# Patient Record
Sex: MALE | Race: WHITE | Employment: OTHER | URBAN - METROPOLITAN AREA
[De-identification: names, ages, dates, MRNs, and addresses within clinical notes are randomized per-mention and may not be internally consistent; named-entity substitution may affect disease eponyms.]

---

## 2018-07-02 ENCOUNTER — APPOINTMENT (OUTPATIENT)
Dept: GENERAL RADIOLOGY | Facility: CLINIC | Age: 63
DRG: 871 | End: 2018-07-02
Attending: EMERGENCY MEDICINE
Payer: MEDICARE

## 2018-07-02 ENCOUNTER — APPOINTMENT (OUTPATIENT)
Dept: CT IMAGING | Facility: CLINIC | Age: 63
DRG: 871 | End: 2018-07-02
Attending: EMERGENCY MEDICINE
Payer: MEDICARE

## 2018-07-02 ENCOUNTER — HOSPITAL ENCOUNTER (INPATIENT)
Facility: CLINIC | Age: 63
LOS: 2 days | Discharge: HOME | DRG: 871 | End: 2018-07-05
Attending: EMERGENCY MEDICINE | Admitting: INTERNAL MEDICINE
Payer: MEDICARE

## 2018-07-02 DIAGNOSIS — R11.0 NAUSEA: ICD-10-CM

## 2018-07-02 DIAGNOSIS — R17 ELEVATED BILIRUBIN: ICD-10-CM

## 2018-07-02 DIAGNOSIS — D61.818 OTHER PANCYTOPENIA (H): ICD-10-CM

## 2018-07-02 DIAGNOSIS — R74.8 ELEVATED LIVER ENZYMES: ICD-10-CM

## 2018-07-02 DIAGNOSIS — J18.9 PNEUMONIA OF LEFT LOWER LOBE DUE TO INFECTIOUS ORGANISM: ICD-10-CM

## 2018-07-02 LAB
ALBUMIN SERPL-MCNC: 2.9 G/DL (ref 3.4–5)
ALBUMIN UR-MCNC: 100 MG/DL
ALP SERPL-CCNC: 210 U/L (ref 40–150)
ALT SERPL W P-5'-P-CCNC: 273 U/L (ref 0–70)
ANION GAP SERPL CALCULATED.3IONS-SCNC: 10 MMOL/L (ref 3–14)
APPEARANCE UR: CLEAR
AST SERPL W P-5'-P-CCNC: 193 U/L (ref 0–45)
BASOPHILS # BLD AUTO: 0 10E9/L (ref 0–0.2)
BASOPHILS NFR BLD AUTO: 0.7 %
BILIRUB SERPL-MCNC: 2.3 MG/DL (ref 0.2–1.3)
BILIRUB UR QL STRIP: ABNORMAL
BUN SERPL-MCNC: 15 MG/DL (ref 7–30)
CALCIUM SERPL-MCNC: 8.2 MG/DL (ref 8.5–10.1)
CHLORIDE SERPL-SCNC: 98 MMOL/L (ref 94–109)
CO2 SERPL-SCNC: 25 MMOL/L (ref 20–32)
COLOR UR AUTO: ABNORMAL
CREAT SERPL-MCNC: 0.91 MG/DL (ref 0.66–1.25)
DIFFERENTIAL METHOD BLD: ABNORMAL
EOSINOPHIL # BLD AUTO: 0 10E9/L (ref 0–0.7)
EOSINOPHIL NFR BLD AUTO: 0 %
ERYTHROCYTE [DISTWIDTH] IN BLOOD BY AUTOMATED COUNT: 15.8 % (ref 10–15)
GFR SERPL CREATININE-BSD FRML MDRD: 84 ML/MIN/1.7M2
GLUCOSE SERPL-MCNC: 100 MG/DL (ref 70–99)
GLUCOSE UR STRIP-MCNC: NEGATIVE MG/DL
HCT VFR BLD AUTO: 35.1 % (ref 40–53)
HGB BLD-MCNC: 11.7 G/DL (ref 13.3–17.7)
HGB UR QL STRIP: ABNORMAL
IMM GRANULOCYTES # BLD: 0 10E9/L (ref 0–0.4)
IMM GRANULOCYTES NFR BLD: 0.3 %
INTERPRETATION ECG - MUSE: NORMAL
KETONES UR STRIP-MCNC: 5 MG/DL
LACTATE BLD-SCNC: 1.1 MMOL/L (ref 0.7–2)
LEUKOCYTE ESTERASE UR QL STRIP: NEGATIVE
LIPASE SERPL-CCNC: 212 U/L (ref 73–393)
LYMPHOCYTES # BLD AUTO: 0.5 10E9/L (ref 0.8–5.3)
LYMPHOCYTES NFR BLD AUTO: 16.5 %
MCH RBC QN AUTO: 29.9 PG (ref 26.5–33)
MCHC RBC AUTO-ENTMCNC: 33.3 G/DL (ref 31.5–36.5)
MCV RBC AUTO: 90 FL (ref 78–100)
MONOCYTES # BLD AUTO: 0.3 10E9/L (ref 0–1.3)
MONOCYTES NFR BLD AUTO: 9.3 %
MUCOUS THREADS #/AREA URNS LPF: PRESENT /LPF
NEUTROPHILS # BLD AUTO: 2.1 10E9/L (ref 1.6–8.3)
NEUTROPHILS NFR BLD AUTO: 73.2 %
NITRATE UR QL: NEGATIVE
NRBC # BLD AUTO: 0 10*3/UL
NRBC BLD AUTO-RTO: 0 /100
PH UR STRIP: 7 PH (ref 5–7)
PLATELET # BLD AUTO: 104 10E9/L (ref 150–450)
POTASSIUM SERPL-SCNC: 4.2 MMOL/L (ref 3.4–5.3)
PROT SERPL-MCNC: 7.3 G/DL (ref 6.8–8.8)
RBC # BLD AUTO: 3.91 10E12/L (ref 4.4–5.9)
RBC #/AREA URNS AUTO: 1 /HPF (ref 0–2)
SODIUM SERPL-SCNC: 133 MMOL/L (ref 133–144)
SOURCE: ABNORMAL
SP GR UR STRIP: 1.02 (ref 1–1.03)
UROBILINOGEN UR STRIP-MCNC: 4 MG/DL (ref 0–2)
WBC # BLD AUTO: 3 10E9/L (ref 4–11)
WBC #/AREA URNS AUTO: 2 /HPF (ref 0–5)

## 2018-07-02 PROCEDURE — 96365 THER/PROPH/DIAG IV INF INIT: CPT

## 2018-07-02 PROCEDURE — 71046 X-RAY EXAM CHEST 2 VIEWS: CPT

## 2018-07-02 PROCEDURE — 96367 TX/PROPH/DG ADDL SEQ IV INF: CPT

## 2018-07-02 PROCEDURE — 25000128 H RX IP 250 OP 636: Performed by: EMERGENCY MEDICINE

## 2018-07-02 PROCEDURE — G0378 HOSPITAL OBSERVATION PER HR: HCPCS

## 2018-07-02 PROCEDURE — 96361 HYDRATE IV INFUSION ADD-ON: CPT

## 2018-07-02 PROCEDURE — 25000125 ZZHC RX 250: Performed by: EMERGENCY MEDICINE

## 2018-07-02 PROCEDURE — 83605 ASSAY OF LACTIC ACID: CPT | Performed by: EMERGENCY MEDICINE

## 2018-07-02 PROCEDURE — 96376 TX/PRO/DX INJ SAME DRUG ADON: CPT

## 2018-07-02 PROCEDURE — 74177 CT ABD & PELVIS W/CONTRAST: CPT

## 2018-07-02 PROCEDURE — A9270 NON-COVERED ITEM OR SERVICE: HCPCS | Mod: GY | Performed by: INTERNAL MEDICINE

## 2018-07-02 PROCEDURE — 81001 URINALYSIS AUTO W/SCOPE: CPT | Performed by: EMERGENCY MEDICINE

## 2018-07-02 PROCEDURE — 99285 EMERGENCY DEPT VISIT HI MDM: CPT | Mod: 25

## 2018-07-02 PROCEDURE — 80053 COMPREHEN METABOLIC PANEL: CPT | Performed by: EMERGENCY MEDICINE

## 2018-07-02 PROCEDURE — 96375 TX/PRO/DX INJ NEW DRUG ADDON: CPT

## 2018-07-02 PROCEDURE — 83690 ASSAY OF LIPASE: CPT | Performed by: EMERGENCY MEDICINE

## 2018-07-02 PROCEDURE — 93005 ELECTROCARDIOGRAM TRACING: CPT

## 2018-07-02 PROCEDURE — 87040 BLOOD CULTURE FOR BACTERIA: CPT | Performed by: EMERGENCY MEDICINE

## 2018-07-02 PROCEDURE — 25000128 H RX IP 250 OP 636: Performed by: INTERNAL MEDICINE

## 2018-07-02 PROCEDURE — 85025 COMPLETE CBC W/AUTO DIFF WBC: CPT | Performed by: EMERGENCY MEDICINE

## 2018-07-02 PROCEDURE — 36415 COLL VENOUS BLD VENIPUNCTURE: CPT

## 2018-07-02 PROCEDURE — 25000132 ZZH RX MED GY IP 250 OP 250 PS 637: Mod: GY | Performed by: INTERNAL MEDICINE

## 2018-07-02 PROCEDURE — 94640 AIRWAY INHALATION TREATMENT: CPT

## 2018-07-02 PROCEDURE — 25000132 ZZH RX MED GY IP 250 OP 250 PS 637: Performed by: EMERGENCY MEDICINE

## 2018-07-02 PROCEDURE — 40000275 ZZH STATISTIC RCP TIME EA 10 MIN

## 2018-07-02 PROCEDURE — 25000125 ZZHC RX 250: Performed by: INTERNAL MEDICINE

## 2018-07-02 PROCEDURE — 99220 ZZC INITIAL OBSERVATION CARE,LEVL III: CPT | Performed by: INTERNAL MEDICINE

## 2018-07-02 RX ORDER — ACETAMINOPHEN 650 MG/1
650 SUPPOSITORY RECTAL EVERY 4 HOURS PRN
Status: DISCONTINUED | OUTPATIENT
Start: 2018-07-02 | End: 2018-07-02

## 2018-07-02 RX ORDER — SODIUM CHLORIDE, SODIUM LACTATE, POTASSIUM CHLORIDE, CALCIUM CHLORIDE 600; 310; 30; 20 MG/100ML; MG/100ML; MG/100ML; MG/100ML
1000 INJECTION, SOLUTION INTRAVENOUS CONTINUOUS
Status: DISCONTINUED | OUTPATIENT
Start: 2018-07-02 | End: 2018-07-02

## 2018-07-02 RX ORDER — AZITHROMYCIN 250 MG/1
TABLET, FILM COATED ORAL
Qty: 4 TABLET | Refills: 0 | Status: SHIPPED | OUTPATIENT
Start: 2018-07-02 | End: 2018-07-05

## 2018-07-02 RX ORDER — TAMSULOSIN HYDROCHLORIDE 0.4 MG/1
0.4 CAPSULE ORAL AT BEDTIME
Status: DISCONTINUED | OUTPATIENT
Start: 2018-07-02 | End: 2018-07-05 | Stop reason: HOSPADM

## 2018-07-02 RX ORDER — IOPAMIDOL 755 MG/ML
91 INJECTION, SOLUTION INTRAVASCULAR ONCE
Status: COMPLETED | OUTPATIENT
Start: 2018-07-02 | End: 2018-07-02

## 2018-07-02 RX ORDER — SODIUM CHLORIDE, SODIUM LACTATE, POTASSIUM CHLORIDE, CALCIUM CHLORIDE 600; 310; 30; 20 MG/100ML; MG/100ML; MG/100ML; MG/100ML
INJECTION, SOLUTION INTRAVENOUS CONTINUOUS
Status: DISCONTINUED | OUTPATIENT
Start: 2018-07-02 | End: 2018-07-03

## 2018-07-02 RX ORDER — ONDANSETRON 4 MG/1
4-8 TABLET, ORALLY DISINTEGRATING ORAL EVERY 8 HOURS PRN
Qty: 30 TABLET | Refills: 0 | Status: SHIPPED | OUTPATIENT
Start: 2018-07-02 | End: 2018-07-05

## 2018-07-02 RX ORDER — ACETAMINOPHEN 325 MG/1
650 TABLET ORAL EVERY 4 HOURS PRN
Status: DISCONTINUED | OUTPATIENT
Start: 2018-07-02 | End: 2018-07-02

## 2018-07-02 RX ORDER — ONDANSETRON 2 MG/ML
4 INJECTION INTRAMUSCULAR; INTRAVENOUS EVERY 6 HOURS PRN
Status: DISCONTINUED | OUTPATIENT
Start: 2018-07-02 | End: 2018-07-05 | Stop reason: HOSPADM

## 2018-07-02 RX ORDER — ACETAMINOPHEN 650 MG/1
650 SUPPOSITORY RECTAL EVERY 8 HOURS PRN
Status: DISCONTINUED | OUTPATIENT
Start: 2018-07-02 | End: 2018-07-02

## 2018-07-02 RX ORDER — NALOXONE HYDROCHLORIDE 0.4 MG/ML
.1-.4 INJECTION, SOLUTION INTRAMUSCULAR; INTRAVENOUS; SUBCUTANEOUS
Status: DISCONTINUED | OUTPATIENT
Start: 2018-07-02 | End: 2018-07-05 | Stop reason: HOSPADM

## 2018-07-02 RX ORDER — IBUPROFEN 600 MG/1
600 TABLET, FILM COATED ORAL EVERY 6 HOURS PRN
Status: DISCONTINUED | OUTPATIENT
Start: 2018-07-02 | End: 2018-07-04

## 2018-07-02 RX ORDER — ACETAMINOPHEN 325 MG/1
650 TABLET ORAL ONCE
Status: DISCONTINUED | OUTPATIENT
Start: 2018-07-02 | End: 2018-07-02

## 2018-07-02 RX ORDER — ALBUTEROL SULFATE 0.83 MG/ML
2.5 SOLUTION RESPIRATORY (INHALATION) 4 TIMES DAILY
Status: DISCONTINUED | OUTPATIENT
Start: 2018-07-02 | End: 2018-07-05 | Stop reason: HOSPADM

## 2018-07-02 RX ORDER — AZITHROMYCIN 250 MG/1
250 TABLET, FILM COATED ORAL EVERY EVENING
Status: DISCONTINUED | OUTPATIENT
Start: 2018-07-03 | End: 2018-07-04

## 2018-07-02 RX ORDER — CEFTRIAXONE 2 G/1
2 INJECTION, POWDER, FOR SOLUTION INTRAMUSCULAR; INTRAVENOUS ONCE
Status: COMPLETED | OUTPATIENT
Start: 2018-07-02 | End: 2018-07-02

## 2018-07-02 RX ORDER — ALBUTEROL SULFATE 0.83 MG/ML
2.5 SOLUTION RESPIRATORY (INHALATION)
Status: DISCONTINUED | OUTPATIENT
Start: 2018-07-02 | End: 2018-07-05 | Stop reason: HOSPADM

## 2018-07-02 RX ORDER — TAMSULOSIN HYDROCHLORIDE 0.4 MG/1
0.4 CAPSULE ORAL AT BEDTIME
COMMUNITY

## 2018-07-02 RX ORDER — CEFTRIAXONE 2 G/1
2 INJECTION, POWDER, FOR SOLUTION INTRAMUSCULAR; INTRAVENOUS EVERY 24 HOURS
Status: DISCONTINUED | OUTPATIENT
Start: 2018-07-03 | End: 2018-07-05 | Stop reason: HOSPADM

## 2018-07-02 RX ORDER — ONDANSETRON 4 MG/1
4 TABLET, ORALLY DISINTEGRATING ORAL EVERY 6 HOURS PRN
Status: DISCONTINUED | OUTPATIENT
Start: 2018-07-02 | End: 2018-07-05 | Stop reason: HOSPADM

## 2018-07-02 RX ORDER — ONDANSETRON 2 MG/ML
4 INJECTION INTRAMUSCULAR; INTRAVENOUS EVERY 30 MIN PRN
Status: DISCONTINUED | OUTPATIENT
Start: 2018-07-02 | End: 2018-07-02

## 2018-07-02 RX ORDER — ACETAMINOPHEN 325 MG/1
650 TABLET ORAL EVERY 8 HOURS PRN
Status: DISCONTINUED | OUTPATIENT
Start: 2018-07-02 | End: 2018-07-02

## 2018-07-02 RX ADMIN — ONDANSETRON 4 MG: 2 INJECTION INTRAMUSCULAR; INTRAVENOUS at 12:50

## 2018-07-02 RX ADMIN — ALBUTEROL SULFATE 2.5 MG: 2.5 SOLUTION RESPIRATORY (INHALATION) at 19:51

## 2018-07-02 RX ADMIN — SODIUM CHLORIDE, POTASSIUM CHLORIDE, SODIUM LACTATE AND CALCIUM CHLORIDE: 600; 310; 30; 20 INJECTION, SOLUTION INTRAVENOUS at 20:01

## 2018-07-02 RX ADMIN — IOPAMIDOL 91 ML: 755 INJECTION, SOLUTION INTRAVENOUS at 13:42

## 2018-07-02 RX ADMIN — CEFTRIAXONE SODIUM 2 G: 2 INJECTION, POWDER, FOR SOLUTION INTRAMUSCULAR; INTRAVENOUS at 15:36

## 2018-07-02 RX ADMIN — TAMSULOSIN HYDROCHLORIDE 0.4 MG: 0.4 CAPSULE ORAL at 21:38

## 2018-07-02 RX ADMIN — SODIUM CHLORIDE, POTASSIUM CHLORIDE, SODIUM LACTATE AND CALCIUM CHLORIDE 1000 ML: 600; 310; 30; 20 INJECTION, SOLUTION INTRAVENOUS at 12:51

## 2018-07-02 RX ADMIN — SODIUM CHLORIDE, POTASSIUM CHLORIDE, SODIUM LACTATE AND CALCIUM CHLORIDE 1000 ML: 600; 310; 30; 20 INJECTION, SOLUTION INTRAVENOUS at 18:36

## 2018-07-02 RX ADMIN — SODIUM CHLORIDE, POTASSIUM CHLORIDE, SODIUM LACTATE AND CALCIUM CHLORIDE 1000 ML: 600; 310; 30; 20 INJECTION, SOLUTION INTRAVENOUS at 14:20

## 2018-07-02 RX ADMIN — ONDANSETRON 4 MG: 2 INJECTION INTRAMUSCULAR; INTRAVENOUS at 16:16

## 2018-07-02 RX ADMIN — IBUPROFEN 600 MG: 600 TABLET ORAL at 19:58

## 2018-07-02 RX ADMIN — AZITHROMYCIN MONOHYDRATE 500 MG: 500 INJECTION, POWDER, LYOPHILIZED, FOR SOLUTION INTRAVENOUS at 16:21

## 2018-07-02 RX ADMIN — SODIUM CHLORIDE 68 ML: 9 INJECTION, SOLUTION INTRAVENOUS at 13:43

## 2018-07-02 ASSESSMENT — ENCOUNTER SYMPTOMS
COUGH: 1
BACK PAIN: 1
NAUSEA: 1
PHOTOPHOBIA: 0
SHORTNESS OF BREATH: 0
MYALGIAS: 1
VOMITING: 0
NUMBNESS: 0
DIAPHORESIS: 1
CONSTIPATION: 0
HEADACHES: 1
ABDOMINAL PAIN: 1
FEVER: 1
APPETITE CHANGE: 1
WEAKNESS: 1
DIARRHEA: 0
LIGHT-HEADEDNESS: 0
NECK PAIN: 1
DIZZINESS: 0
CHILLS: 1
FATIGUE: 1

## 2018-07-02 NOTE — IP AVS SNAPSHOT
"[place] [address] [facility]: [phone]                                              INTERAGENCY TRANSFER FORM - PHYSICIAN ORDERS   2018                    Hospital Admission Date: 2018  JAMIR BOYLE   : 1955  Sex: Male        Attending Provider: Ramon Hackett DO     Allergies:  No Known Allergies    Infection:  None   Service:  HOSPITALIST    Ht:  1.918 m (6' 3.5\")   Wt:  82.1 kg (181 lb)   Admission Wt:  82.1 kg (181 lb)    BMI:  22.32 kg/m 2   BSA:  2.09 m 2            Patient PCP Information     Provider PCP Type    Physician No Ref-Primary General      ED Clinical Impression     Diagnosis Description Comment Added By Time Added    Pneumonia of left lower lobe due to infectious organism (H) [J18.1] Pneumonia of left lower lobe due to infectious organism (H) [J18.1]  Jany Willard MD 2018  3:45 PM    Nausea [R11.0] Nausea [R11.0]  Jany Willard MD 2018  3:45 PM    Elevated bilirubin [R17] Elevated bilirubin [R17]  Jany Willard MD 2018  3:48 PM    Elevated liver enzymes [R74.8] Elevated liver enzymes [R74.8]  Jany Willard MD 2018  3:48 PM    Other pancytopenia (H) [D61.818] Other pancytopenia (H) [D61.818]  Jany Willard MD 2018  3:48 PM      Hospital Problems as of 2018              Priority Class Noted POA    CAP (community acquired pneumonia) Medium  2018 Yes    Pneumonia Medium  7/3/2018 Yes      Non-Hospital Problems as of 2018     None      Code Status History     Date Active Date Inactive Code Status Order ID Comments User Context    2018 10:58 AM  Full Code [id number]  Ramon Hackett DO Outpatient    2018  7:25 PM 2018 10:58 AM Full Code [id number]  Chris Otriz DO Inpatient         Medication Review      START taking        Dose / Directions Comments    amoxicillin-clavulanate 875-125 MG per tablet   Commonly known as:  AUGMENTIN   Used for:  Pneumonia of left lower lobe due to infectious organism (H)        " Dose:  1 tablet   Take 1 tablet by mouth 2 times daily   Quantity:  14 tablet   Refills:  0        ondansetron 4 MG ODT tab   Commonly known as:  ZOFRAN ODT        Dose:  4-8 mg   Take 1-2 tablets (4-8 mg) by mouth every 8 hours as needed for nausea   Quantity:  30 tablet   Refills:  0          CONTINUE these medications which have NOT CHANGED        Dose / Directions Comments    CELEBREX PO        Dose:  100 mg   Take 100 mg by mouth 2 times daily as needed for moderate pain   Refills:  0        FLOMAX 0.4 MG capsule   Generic drug:  tamsulosin        Dose:  0.4 mg   Take 0.4 mg by mouth At Bedtime   Refills:  0        OMEPRAZOLE PO        Dose:  20 mg   Take 20 mg by mouth daily as needed (Takes when taking ibuprofen)   Refills:  0        SYNTHROID PO        Dose:  75 mcg   Take 75 mcg by mouth daily   Refills:  0        VITAMIN D (CHOLECALCIFEROL) PO   Notes to Patient:  Resume home dose         Dose:  1000 Units   Take 1,000 Units by mouth daily   Refills:  0          STOP taking     IBUPROFEN PO           TURMERIC CURCUMIN PO                     Further instructions from your care team       Pt has clothes/shoes in bedside dresser.    *No school or work until you have been without a fever for 24 hours with tylenol or motrin.  *Take medications as prescribed.  Ibuprofen for pain and fever.  Zofran for nausea. Augmentin and azithromycin.  *Follow-up with your doctor for a recheck in 2-3 days.    *Return if you develop difficulty breathing or swallowing, are unable to keep fluids down, faint or feel like you will faint or become worse in any way.    Discharge Instructions  Bronchitis, Pneumonia, Bronchospasm    You were seen today for a chest infection or inflammation. If your doctor decided this was due to a bacterial infection, you may need an antibiotic. Sometimes these are caused by a virus, and then an antibiotic will not help.     Return to the Emergency Department if:    Your breathing gets much  "worse.    You are very weak, or feel much more ill.    You develop new symptoms, such as chest pain.    You cough up blood.    You are vomiting enough that you can t keep fluids or your medicine down.    What can I do to help myself?    Fill any prescriptions the doctor gave you and take them right away--especially antibiotics. Be sure to finish the whole antibiotic prescription.    You may be given a prescription for an inhaler, which can help loosen tight air passages.  Use this as needed, but not more often than directed. Inhalers work much better when used with a spacer.     You may be given a prescription for a steroid to reduce inflammation. Used long-term, these can have many serious side effects, but for short courses these do not happen. You may notice restlessness or increased appetite.        You may use non-prescription cough or cold medicines. Cough medicines may help, but don t make the cough go away completely.     Avoid smoke, because this can make your symptoms worse. If you smoke, this may be a good time to quit! Consider using nicotine lozenges, gum, or patches to reduce cravings.     If you have a fever, Tylenol  (acetaminophen), Motrin  (ibuprofen), or Advil  (ibuprofen) may help bring fever down and may help you feel more comfortable. Be sure to read and follow the package directions, and ask your doctor if you have questions.    Be sure to get your flu shot each year.  The pneumonia shot can help prevent pneumonia.  Probiotics: If you have been given an antibiotic, you may want to also take a probiotic pill or eat yogurt with live cultures. Probiotics have \"good bacteria\" to help your intestines stay healthy. Studies have shown that probiotics help prevent diarrhea and other intestine problems (including C. diff infection) when you take antibiotics. You can buy these without a prescription in the pharmacy section of the store.     If your doctor has told you to follow-up at your clinic, be " sure to call right away and go to your appointment.  If there is any problem with keeping your appointment, call your doctor or return to the Emergency Department.    If you were given a prescription for medicine here today, be sure to read all of the information (including the package insert) that comes with your prescription.  This will include important information about the medicine, its side effects, and any warnings that you need to know about.  The pharmacist who fills the prescription can provide more information and answer questions you may have about the medicine.  If you have questions or concerns that the pharmacist cannot address, please call or return to the Emergency Department.     Opioid Medication Information    Pain medications are among the most commonly prescribed medicines, so we are including this information for all our patients. If you did not receive pain medication or get a prescription for pain medicine, you can ignore it.     You may have been given a prescription for an opioid (narcotic) pain medicine and/or have received a pain medicine while here in the Emergency Department. These medicines can make you drowsy or impaired. You must not drive, operate dangerous equipment, or engage in any other dangerous activities while taking these medications. If you drive while taking these medications, you could be arrested for DUI, or driving under the influence. Do not drink any alcohol while you are taking these medications.     Opioid pain medications can cause addiction. If you have a history of chemical dependency of any type, you are at a higher risk of becoming addicted to pain medications.  Only take these prescribed medications to treat your pain when all other options have been tried. Take it for as short a time and as few doses as possible. Store your pain pills in a secure place, as they are frequently stolen and provide a dangerous opportunity for children or visitors in your house  to start abusing these powerful medications. We will not replace any lost or stolen medicine.  As soon as your pain is better, you should flush all your remaining medication.     Many prescription pain medications contain Tylenol  (acetaminophen), including Vicodin , Tylenol #3 , Norco , Lortab , and Percocet .  You should not take any extra pills of Tylenol  if you are using these prescription medications or you can get very sick.  Do not ever take more than 3000 mg of acetaminophen in any 24 hour period.    All opioids tend to cause constipation. Drink plenty of water and eat foods that have a lot of fiber, such as fruits, vegetables, prune juice, apple juice and high fiber cereal.  Take a laxative if you don t move your bowels at least every other day. Miralax , Milk of Magnesia, Colace , or Senna  can be used to keep you regular.      Remember that you can always come back to the Emergency Department if you are not able to see your regular doctor in the amount of time listed above, if you get any new symptoms, or if there is anything that worries you.            Summary of Visit     Reason for your hospital stay       Pneumonia             After Care     Activity       Your activity upon discharge: activity as tolerated       Diet       Follow this diet upon discharge: Orders Placed This Encounter      Regular Diet Adult             Follow-Up Appointment Instructions     Future Labs/Procedures    Follow-up and recommended labs and tests      Comments:    Follow up with primary care provider, Physician No Ref-Primary, within 7 days for hospital follow- up.  The following labs/tests are recommended: cbc/bmp.      Follow-Up Appointment Instructions     Follow-up and recommended labs and tests        Follow up with primary care provider, Physician No Ref-Primary, within 7 days for hospital follow- up.  The following labs/tests are recommended: cbc/bmp.             Statement of Approval     Ordered          07/05/18  1058  I have reviewed and agree with all the recommendations and orders detailed in this document.  EFFECTIVE NOW     Approved and electronically signed by:  Ramon Hackett DO

## 2018-07-02 NOTE — ED NOTES
[facility]  ED Nurse Handoff Report    ED Chief complaint: Nausea (Patient inw ith complaints of nausea, fever and chills since Friday night. [facility] for concerns of dehydration.) and Fever      ED Diagnosis:   Final diagnoses:   Pneumonia of left lower lobe due to infectious organism (H)   Nausea   Elevated bilirubin   Elevated liver enzymes   Other pancytopenia (H)       Code Status: Full Code    Allergies: No Known Allergies    Activity level - Baseline/Home:  Independent    Activity Level - Current:   Independent- SBA     Needed?: No    Isolation: No  Infection: Not Applicable  Bariatric?: No    Vital Signs:   Vitals:    07/02/18 1620 07/02/18 1700 07/02/18 1715 07/02/18 1730   BP: 120/66      Pulse:       Resp:  22     Temp:       TempSrc:       SpO2: (!) 89% (!) 89% 92% 91%   Weight:       Height:           Cardiac Rhythm: ,        Pain level: 0-10 Pain Scale: 0    Is this patient confused?: No   [place] - Suicide Severity Rating Scale Completed?  Yes  If yes, what color did the patient score?  White    Patient Report: Initial Complaint: nausea, fever  Focused Assessment:   Resp: SOB, sats in the mid 80s-low 90s. Stays around 90-92%, dips into the 80s when resting/sleeping. Pt has pneumonia   Cardiac: WDL  Neuro: WDL  GI: pt has had nausea since yesterday. Reduced appetite.   Tests Performed: labs, CT, xray  Abnormal Results: low sats, pneumonia, see lab results   Treatments provided: antibiotics, fluids    Family Comments: son and wife present    OBS brochure/video discussed/provided to patient: Yes    ED Medications:   Medications   lactated ringers BOLUS 1,000 mL (0 mLs Intravenous Stopped 7/2/18 1419)     Followed by   lactated ringers BOLUS 1,000 mL (0 mLs Intravenous Stopped 7/2/18 1534)     Followed by   lactated ringers infusion (not administered)   acetaminophen (TYLENOL) tablet 650 mg (650 mg Oral Not Given 7/2/18 1300)   ondansetron (ZOFRAN)  injection 4 mg (4 mg Intravenous Given 7/2/18 1616)   iopamidol (ISOVUE-370) solution 91 mL (91 mLs Intravenous Given 7/2/18 1342)   Saline flush (68 mLs Intravenous Given 7/2/18 1343)   cefTRIAXone (ROCEPHIN) 2 g vial to attach to  ml bag for ADULTS or NS 50 ml bag for PEDS (0 g Intravenous Stopped 7/2/18 1616)   azithromycin (ZITHROMAX) 500 mg in sodium chloride 0.9 % 250 mL intermittent infusion (0 mg Intravenous Stopped 7/2/18 1732)       Drips infusing?:  Yes    For the majority of the shift this patient was Green.   Interventions performed were n/a.    Severe Sepsis OR Septic Shock Diagnosis Present: No      ED NURSE PHONE NUMBER: [phone]

## 2018-07-02 NOTE — ED PROVIDER NOTES
History     Chief Complaint:  Nausea and fever    HPI   Elmer Carter is a 63 year old male with a history of hypothyroidism who presents with family to the ED for evaluation of nausea and fever. The patient flew in with family from [place] last week. 3 days ago, he developed some mild nausea. 2 days ago, this progressed and he developed a headache, chills, diaphoresis, and darker urine. Yesterday he developed a dry cough with deep breaths along with these previous symptoms. Today, he spiked a fever of 102.9 along with generalized weakness and fatigue, notably for back and neck soreness. Patient thinks his fever began 2 days ago but did not have a thermometer to check. His symptoms have progressively worsened over the last 3 days. He describes the headache to be located in the frontal and bilateral parietal areas but denies any photophobia or visual changes. He has been drinking about 2L of water per day but is concerned about dehydration. He is able to eat small amounts at a time but then feels increased nausea. Patient has not had any episodes of vomiting. He has been taking nauzene OTC which has helped slightly. Yesterday, patient took 8 ibuprofen and 8 tylenol throughout the day which aided in some relief. Patient has not taken any medications today. Of note, patient has been under increased stress recently and has had a lack of sleep as well. He denies any recent ill contact. On evaluation here, patient has mild abdominal tenderness to palpation. He denies any vomiting, diarrhea, constipation, urinary symptoms, rash, shortness of breath, chest pain, known tick exposure, or recent camping.    Allergies:  No known drug allergies    Medications:    Synthroid  Protonix  Flomax    Past Medical History:    Thyroid disease    Past Surgical History:    Cholecystectomy  Orthopedic surgery    Family History:    History reviewed. No pertinent family history.     Social History:  Smoking status: Never  Alcohol use:  "No  PCP: Delvin Tyler at [facility] in [place], [place]     Review of Systems   Constitutional: Positive for appetite change, chills, diaphoresis, fatigue and fever.   Eyes: Negative for photophobia and visual disturbance.   Respiratory: Positive for cough. Negative for shortness of breath.    Cardiovascular: Negative for chest pain.   Gastrointestinal: Positive for abdominal pain and nausea. Negative for constipation, diarrhea and vomiting.   Genitourinary: Negative.    Musculoskeletal: Positive for back pain, myalgias (generalized aches) and neck pain.   Skin: Negative for rash.   Neurological: Positive for weakness and headaches. Negative for dizziness, light-headedness and numbness.   All other systems reviewed and are negative.    Physical Exam   Patient Vitals for the past 24 hrs:   BP Temp Temp src Pulse Heart Rate Resp SpO2 Height Weight   07/02/18 1730 - - - - 90 - 91 % - -   07/02/18 1715 - - - - 92 - 92 % - -   07/02/18 1700 - - - - 90 22 (!) 89 % - -   07/02/18 1620 120/66 - - - 92 - (!) 89 % - -   07/02/18 1530 - - - - 90 - 93 % - -   07/02/18 1500 129/72 - - - 87 - 93 % - -   07/02/18 1415 - - - - - - 94 % - -   07/02/18 1300 124/75 101.9  F (38.8  C) - - - - - - -   07/02/18 1245 121/66 - - - - - - - -   07/02/18 1132 115/54 102.9  F (39.4  C) Oral 89 - 16 97 % 1.918 m (6' 3.5\") 82.1 kg (181 lb)     Physical Exam  General: Well-nourished, appears fatigued, warm to touch  Eyes: PERRL, conjunctivae pink no scleral icterus or conjunctival injection.  No photophobia  ENT:  Moist mucus membranes, posterior oropharynx clear without erythema or exudates  Respiratory:  Lungs clear to auscultation bilaterally, occasional crackles left lung base, no rubs/wheezes.  Good air movement  CV: Normal rate and rhythm, no murmurs/rubs/gallops  GI:  Abdomen soft and non-distended.  Normoactive BS.  Mild diffuse tenderness, no guarding or rebound  Skin: Warm, dry.  No rashes or petechiae  Musculoskeletal: No " peripheral edema or calf tenderness  Neuro: Alert and oriented to person/place/time.  Neck supple.  PERRL, EOMI no nystagmus, no aphasia/facial droop/dysarthria, tongue midline, symmetric palatal elevation, normal strength at SCM/trapezius/BUE/BLE, normal coordination to FNF at BUE, gait deferred, negative romberg, sensation intact to LT over face/BUE/BLE  Psychiatric: Normal affect    Emergency Department Course   ECG (12:41:59):  Rate 85 bpm. CA interval 144. QRS duration 98. QT/QTc 354/421. P-R-T axes 39 37 42. Normal sinus rhythm. Normal ECG. Interpreted at 1243 by Jany Willard MD.    Imaging:  Radiographic findings were communicated with the patient and family who voiced understanding of the findings.    X-ray Chest, 2 views:  IMPRESSION: Left basilar pneumonia.  Result per radiology.     CT-scan Chest/Abdomen/Pelvis w/ contrast:  IMPRESSION:  1. Bibasilar atelectasis with probable additional infiltrate left lung  base concerning for pneumonia. Lungs are otherwise clear.  2. Trace amount of fluid right paracolic gutter and pelvis without  obvious etiology. Abdominal and pelvic organs are within normal  limits. No bowel obstruction, diverticulitis or appendicitis.  3. Prior cholecystectomy changes.  Preliminary result per radiology.     Laboratory:  UA: Trace blood, Mucous present, Small bilin, Protein albumin 100, Keton 5, Bilinogen 4.0 (H) , o/w Negative  Blood Culture x2: Pending  CBC: WBC 3.0 (L), HGB 11.7 (L),  (L) o/w WNL  CMP: Glucose 100 (H), Calcium 8.2 (L),  (H), Bilirubin total 2.3 (H), Albumin 2.9 (L),  (H), Alkphos 210 (H) o/w WNL (Creatinine 0.91)  Lipase: 212  Lactic Acid: 1.1    Interventions:  1250: Zofran 4mg IV injection   1251: Lactated ringers 1,000mL IV Bolus  1420: Lactated ringers 1,000mL IV Bolus  1536: Rocephin 2g with  mL IV  1616: Zofran 4mg IV injection   1621: Zithromax 500mg in sodium chloride 0.9% 250mL IV intermittent infusion    Emergency Department  Course:  Past medical records, nursing notes, and vitals reviewed.  1219: I performed an exam of the patient and obtained history, as documented above. GCS 15.    IV inserted and blood drawn.    The patient was sent for a x-ray and CT-scan while in the emergency department, findings above.    1533: I rechecked the patient. Explained findings to patient.    Patient's O2 sats have remained low 90's.    1704: I rechecked the patient. Explained findings to patient and family.    Findings and plan explained to the Patient and spouse and son who consents to admission.     1736: Discussed the patient with Dr. Ortiz, who will admit the patient to an observation bed for further monitoring, evaluation, and treatment.     Impression & Plan      Medical Decision Making:  Elmer Carter is a 63 year old male who comes today with fever, myalgias, headache, cough, nausea and some generalized abdominal discomfort.  I considered a broad differential.  He does not appear to have meningitis on clinical examination.  No signs of urinary tract infection on his urinalysis.  Given his generalized abdominal discomfort as well as a mild cough CT chest abdomen and pelvis were obtained and do show signs of a possible left lower lobe pneumonia.  Labs show an elevated bilirubin as well as elevated liver enzymes with some mild pancytopenia.  He is status post cholecystectomy and there is no signs of choledocholithiasis or ascending cholangitis on the CT scan.  He does not have particular tenderness in the right upper quadrant.  No tick bites to account for his symptoms and it is more likely due to the pneumonia.  Blood cultures are pending.  Lactate was normal.  Is fairly well-appearing and we were planning to discharge him, but he subsequently started to have some hypoxia.  Additionally he continues to feel nauseated with poor p.o. intake.  He was given his first dose of IV antibiotics here.  Given the new hypoxia and persistent nausea will  admit him to the hospital observation unit for ongoing IV fluids and monitoring.  He will receive additional antibiotics as needed here.  The patient and his family were comfortable with this plan.  Chris Ortiz DO graciously agreed to admit the patient.    Diagnosis:    ICD-10-CM   1. Pneumonia of left lower lobe due to infectious organism (H) J18.1   2. Nausea R11.0   3. Elevated bilirubin R17   4. Elevated liver enzymes R74.8   5. Other pancytopenia (H) D61.818       Disposition:  Admitted to Dr. Ortiz.    Discharge Medications:  New Prescriptions    AMOXICILLIN-CLAVULANATE (AUGMENTIN) 875-125 MG PER TABLET    Take 1 tablet by mouth 2 times daily for 7 days    AZITHROMYCIN (ZITHROMAX Z-SOLIS) 250 MG TABLET    First dose given in the ED    ONDANSETRON (ZOFRAN ODT) 4 MG ODT TAB    Take 1-2 tablets (4-8 mg) by mouth every 8 hours as needed for nausea         Megan Quiroz  7/2/2018    EMERGENCY DEPARTMENT  I, Megan Quiroz, am serving as a scribe at 12:19 PM on 7/2/2018 to document services personally performed by Jany Willard MD based on my observations and the provider's statements to me.        Jany Willard MD  07/02/18 0162

## 2018-07-02 NOTE — IP AVS SNAPSHOT
"    [place] [address] [facility]: [phone]                                              INTERAGENCY TRANSFER FORM - LAB / IMAGING / EKG / EMG RESULTS   2018                    Hospital Admission Date: 2018  JAMIR BOYLE   : 1955  Sex: Male        Attending Provider: Ramon Hackett DO     Allergies:  No Known Allergies    Infection:  None   Service:  HOSPITALIST    Ht:  1.918 m (6' 3.5\")   Wt:  82.1 kg (181 lb)   Admission Wt:  82.1 kg (181 lb)    BMI:  22.32 kg/m 2   BSA:  2.09 m 2            Patient PCP Information     Provider PCP Type    Physician No Ref-Primary General         Lab Results - 3 Days      Procalcitonin [[id number]]  Resulted: 18 0900, Result status: Final result    Ordering provider: Ramon Hackett DO  18 0000 Resulting lab: [facility]    Specimen Information    Type Source Collected On   Blood  18 0750          Components       Value Reference Range Flag Lab   Procalcitonin 0.69 ng/ml  FrStLb   Comment:         0.50-1.99 ng/ml  Moderate risk of systemic infection.  Recommendation:   Recommend antibiotics. Evaluate culture results and clinical features to   target antibacterial therapy. Obtain blood cultures and other relevant   cultures if not done.  If empiric antibiotics were started, recheck PCT in: 2 days to guide   antibiotic de-escalation.  Discontinue or de-escalate antibiotics when PCT   concentration is <80% of peak or abs PCT <0.5. If empiric antibiotics were NOT   started, recheck PCT in 6-24 hours to re-evaluate need for antibiotics.              Magnesium [[id number]]  Resulted: 18 0857, Result status: Final result    Ordering provider: Ramon Hackett DO  18 0750 Resulting lab: [facility]    Specimen Information    Type Source Collected On     18 0750          Components       Value Reference Range Flag Lab   Magnesium 1.7 1.6 - 2.3 mg/dL  FrStHsLb       "      NT proBNP inpatient and ED [[id number]] (Abnormal)  Resulted: 07/05/18 0844, Result status: Final result    Ordering provider: Ramon Hackett DO  07/05/18 0000 Resulting lab: [facility]    Specimen Information    Type Source Collected On   Blood  07/05/18 0750          Components       Value Reference Range Flag Lab   N-Terminal Pro BNP Inpatient 1925 0 - 900 pg/mL H FrStHsLb   Comment:            Reference range shown and results flagged as abnormal are suggested inpatient   cut points for confirming diagnosis if CHF in an acute setting. Establishing a   baseline value for each individual patient is useful for follow-up. An   inpatient or emergency department NT-proPBNP <300 pg/mL effectively rules out   acute CHF, with 99% negative predictive value.  The outpatient non-acute reference range for ruling out CHF is:   0-125 pg/mL (age 18 to less than 75)   0-450 pg/mL (age 75 yrs and older)              Comprehensive metabolic panel [[id number]] (Abnormal)  Resulted: 07/05/18 0843, Result status: Final result    Ordering provider: Ramon Hackett DO  07/05/18 0000 Resulting lab: [facility]    Specimen Information    Type Source Collected On   Blood  07/05/18 0750          Components       Value Reference Range Flag Lab   Sodium 137 133 - 144 mmol/L  FrStHsLb   Potassium 4.1 3.4 - 5.3 mmol/L  FrStHsLb   Chloride 103 94 - 109 mmol/L  FrStHsLb   Carbon Dioxide 29 20 - 32 mmol/L  FrStHsLb   Anion Gap 5 3 - 14 mmol/L  FrStHsLb   Glucose 96 70 - 99 mg/dL  FrStHsLb   Urea Nitrogen 8 7 - 30 mg/dL  FrStHsLb   Creatinine 0.95 0.66 - 1.25 mg/dL  FrStHsLb   GFR Estimate 80 >60 mL/min/1.7m2  FrStHsLb   Comment:  Non  GFR Calc   GFR Estimate If Black >90 >60 mL/min/1.7m2  FrStHsLb   Comment:  African American GFR Calc   Calcium 7.8 8.5 - 10.1 mg/dL L FrStHsLb   Bilirubin Total 1.9 0.2 - 1.3 mg/dL H FrStHsLb   Albumin 2.0 3.4 - 5.0 g/dL L FrStHsLb   Protein Total  5.8 6.8 - 8.8 g/dL L FrStHsLb   Alkaline Phosphatase 221 40 - 150 U/L H FrStHsLb   ALT 92 0 - 70 U/L H FrStHsLb   AST 39 0 - 45 U/L  FrStHsLb            Blood Morphology Pathologist Review [[id number]]  Resulted: 07/05/18 0832, Result status: In process    Ordering provider: Ramon Hackett DO  07/04/18 1152 Resulting lab: [facility]    Specimen Information    Type Source Collected On   Blood  07/04/18 0852            CBC with platelets [[id number]] (Abnormal)  Resulted: 07/05/18 0825, Result status: Final result    Ordering provider: Ramon Hackett DO  07/05/18 0000 Resulting lab: [facility]    Specimen Information    Type Source Collected On   Blood  07/05/18 0750          Components       Value Reference Range Flag Lab   WBC 5.0 4.0 - 11.0 10e9/L  FrStHsLb   RBC Count 3.53 4.4 - 5.9 10e12/L L FrStHsLb   Hemoglobin 10.4 13.3 - 17.7 g/dL L FrStHsLb   Hematocrit 31.9 40.0 - 53.0 % L FrStHsLb   MCV 90 78 - 100 fl  FrStHsLb   MCH 29.5 26.5 - 33.0 pg  FrStHsLb   MCHC 32.6 31.5 - 36.5 g/dL  FrStHsLb   RDW 16.6 10.0 - 15.0 % H FrStHsLb   Platelet Count 127 150 - 450 10e9/L L FrStHsLb            Blood culture [[id number]]  Resulted: 07/05/18 0729, Result status: Preliminary result    Ordering provider: Ramon Hackett DO  07/04/18 1943 Resulting lab: [facility]    Specimen Information    Type Source Collected On   Blood  07/04/18 2017   Comment:  Right Hand          Components       Value Reference Range Flag Lab   Specimen Description Blood Right Hand      Special Requests Aerobic and anaerobic bottles received   FrStHsLb   Culture Micro No growth after 8 hours   225            Blood culture [[id number]]  Resulted: 07/05/18 0729, Result status: Preliminary result    Ordering provider: Ramon Hackett DO  07/04/18 1943 Resulting lab: [facility]    Specimen Information    Type Source Collected On   Blood  07/04/18 2010    Comment:  Left Arm          Components       Value Reference Range Flag Lab   Specimen Description Blood Left Arm      Special Requests Aerobic and anaerobic bottles received   FrStHsLb   Culture Micro No growth after 8 hours   225            Blood culture [[id number]]  Resulted: 07/05/18 0219, Result status: Preliminary result    Ordering provider: Jany Willard MD  07/02/18 1215 Resulting lab: [facility]    Specimen Information    Type Source Collected On   Blood Arm, Right 07/02/18 1251   Comment:  Right Arm          Components       Value Reference Range Flag Lab   Specimen Description Blood Right Arm      Special Requests Aerobic and anaerobic bottles received   FrStHsLb   Culture Micro No growth after 3 days   75            Blood culture [[id number]]  Resulted: 07/05/18 0219, Result status: Preliminary result    Ordering provider: Jany Willard MD  07/02/18 1215 Resulting lab: [facility]    Specimen Information    Type Source Collected On   Blood Arm, Right 07/02/18 1205   Comment:  Right Arm          Components       Value Reference Range Flag Lab   Specimen Description Blood Right Arm      Special Requests Aerobic and anaerobic bottles received   FrStHsLb   Culture Micro No growth after 3 days   75            Potassium [[id number]]  Resulted: 07/04/18 1927, Result status: Final result    Ordering provider: Ramon Hackett DO  07/04/18 1445 Resulting lab: [facility]    Specimen Information    Type Source Collected On   Blood  07/04/18 1900          Components       Value Reference Range Flag Lab   Potassium 3.9 3.4 - 5.3 mmol/L  FrStHsLb            Magnesium [[id number]]  Resulted: 07/04/18 1556, Result status: Final result    Ordering provider: Jane Platt PA-C  07/04/18 0852 Resulting lab: [facility]    Specimen Information    Type Source Collected On     07/04/18 0852          Components       Value  Reference Range Flag Lab   Magnesium 1.8 1.6 - 2.3 mg/dL  FrStHsLb            Respiratory Virus Panel by PCR [[id number]]  Resulted: 07/04/18 1548, Result status: In process    Ordering provider: Ramon Hackett DO  07/04/18 1218 Resulting lab: [facility]    Specimen Information    Type Source Collected On   Nasopharyngeal  07/04/18 1545            Reticulocyte count [[id number]] (Abnormal)  Resulted: 07/04/18 1214, Result status: Final result    Ordering provider: Jane Platt PA-C  07/04/18 0852 Resulting lab: [facility]    Specimen Information    Type Source Collected On     07/04/18 0852          Components       Value Reference Range Flag Lab   % Retic 0.6 0.5 - 2.0 %  FrStHsLb   Absolute Retic 21.6 25 - 95 10e9/L L FrStHsLb            Basic metabolic panel [[id number]] (Abnormal)  Resulted: 07/04/18 0933, Result status: Final result    Ordering provider: Ramon Hackett DO  07/04/18 0001 Resulting lab: [facility]    Specimen Information    Type Source Collected On   Blood  07/04/18 0852          Components       Value Reference Range Flag Lab   Sodium 143 133 - 144 mmol/L  FrStHsLb   Potassium 3.3 3.4 - 5.3 mmol/L L FrStHsLb   Chloride 108 94 - 109 mmol/L  FrStHsLb   Carbon Dioxide 30 20 - 32 mmol/L  FrStHsLb   Anion Gap 5 3 - 14 mmol/L  FrStHsLb   Glucose 104 70 - 99 mg/dL H FrStHsLb   Urea Nitrogen 11 7 - 30 mg/dL  FrStHsLb   Creatinine 0.88 0.66 - 1.25 mg/dL  FrStHsLb   GFR Estimate 87 >60 mL/min/1.7m2  FrStHsLb   Comment:  Non  GFR Calc   GFR Estimate If Black >90 >60 mL/min/1.7m2  FrStHsLb   Comment:  African American GFR Calc   Calcium 7.5 8.5 - 10.1 mg/dL L FrStHsLb            INR [[id number]]  Resulted: 07/04/18 0915, Result status: Final result    Ordering provider: Jane Platt PA-C  07/04/18 0001 Resulting lab: [facility]    Specimen Information    Type Source Collected On   Blood  07/04/18 0852           Components       Value Reference Range Flag Lab   INR 1.07 0.86 - 1.14  FrStHsLb            CBC with platelets differential [[id number]] (Abnormal)  Resulted: 07/04/18 0912, Result status: Final result    Ordering provider: Jane Platt PA-C  07/04/18 0001 Resulting lab: [facility]    Specimen Information    Type Source Collected On   Blood  07/04/18 0852          Components       Value Reference Range Flag Lab   WBC 3.6 4.0 - 11.0 10e9/L L FrStHsLb   RBC Count 3.41 4.4 - 5.9 10e12/L L FrStHsLb   Hemoglobin 10.2 13.3 - 17.7 g/dL L FrStHsLb   Hematocrit 30.8 40.0 - 53.0 % L FrStHsLb   MCV 90 78 - 100 fl  FrStHsLb   MCH 29.9 26.5 - 33.0 pg  FrStHsLb   MCHC 33.1 31.5 - 36.5 g/dL  FrStHsLb   RDW 16.5 10.0 - 15.0 % H FrStHsLb   Platelet Count 86 150 - 450 10e9/L L FrStHsLb   Diff Method Automated Method   FrStHsLb   % Neutrophils 69.6 %  FrStHsLb   % Lymphocytes 17.2 %  FrStHsLb   % Monocytes 10.1 %  FrStHsLb   % Eosinophils 1.1 %  FrStHsLb   % Basophils 1.4 %  FrStHsLb   % Immature Granulocytes 0.6 %  FrStHsLb   Nucleated RBCs 0 0 /100  FrStHsLb   Absolute Neutrophil 2.5 1.6 - 8.3 10e9/L  FrStHsLb   Absolute Lymphocytes 0.6 0.8 - 5.3 10e9/L L FrStHsLb   Absolute Monocytes 0.4 0.0 - 1.3 10e9/L  FrStHsLb   Absolute Eosinophils 0.0 0.0 - 0.7 10e9/L  FrStHsLb   Absolute Basophils 0.1 0.0 - 0.2 10e9/L  FrStHsLb   Abs Immature Granulocytes 0.0 0 - 0.4 10e9/L  FrStHsLb   Absolute Nucleated RBC 0.0   FrStHsLb            Procalcitonin [[id number]]  Resulted: 07/03/18 1628, Result status: Final result    Ordering provider: Jane Platt PA-C  07/03/18 1510 Resulting lab: [facility]    Specimen Information    Type Source Collected On     07/03/18 1510          Components       Value Reference Range Flag Lab   Procalcitonin 1.29 ng/ml  StLb   Comment:         0.50-1.99 ng/ml  Moderate risk of systemic infection.  Recommendation:   Recommend antibiotics. Evaluate culture results and  clinical features to   target antibacterial therapy. Obtain blood cultures and other relevant   cultures if not done.  If empiric antibiotics were started, recheck PCT in: 2 days to guide   antibiotic de-escalation.  Discontinue or de-escalate antibiotics when PCT   concentration is <80% of peak or abs PCT <0.5. If empiric antibiotics were NOT   started, recheck PCT in 6-24 hours to re-evaluate need for antibiotics.              Hepatitis B Surface Antibody [[id number]]  Resulted: 07/03/18 1539, Result status: In process    Ordering provider: Jane Platt PA-C  07/03/18 1256 Resulting lab: [facility]    Specimen Information    Type Source Collected On   Blood  07/03/18 1510            Hepatitis B surface antigen [[id number]]  Resulted: 07/03/18 1539, Result status: In process    Ordering provider: Jane Platt PA-C  07/03/18 1256 Resulting lab: [facility]    Specimen Information    Type Source Collected On   Blood  07/03/18 1510            Hepatitis C Screen Reflex to HCV RNA Quant and Genotype [[id number]]  Resulted: 07/03/18 1539, Result status: In process    Ordering provider: Jane Platt PA-C  07/03/18 1256 Resulting lab: [facility]    Specimen Information    Type Source Collected On   Blood  07/03/18 1510            WBC Differential [[id number]] (Abnormal)  Resulted: 07/03/18 0831, Result status: Final result    Ordering provider: Chris Ortiz DO  07/03/18 0625 Resulting lab: [facility]    Specimen Information    Type Source Collected On     07/03/18 0625          Components       Value Reference Range Flag Lab   Diff Method Automated Method   FrStHsLb   % Neutrophils 74.0 %  FrStHsLb   % Lymphocytes 16.0 %  FrStHsLb   % Monocytes 7.1 %  FrStHsLb   % Eosinophils 1.4 %  FrStHsLb   % Basophils 1.1 %  FrStHsLb   % Immature Granulocytes 0.4 %  FrStHsLb   Absolute Neutrophil 2.1 1.6 - 8.3 10e9/L  FrStHsLb   Absolute Lymphocytes 0.5 0.8 - 5.3 10e9/L L FrStHsLb   Absolute Monocytes 0.2 0.0 -  1.3 10e9/L  FrStHsLb   Absolute Eosinophils 0.0 0.0 - 0.7 10e9/L  FrStHsLb   Absolute Basophils 0.0 0.0 - 0.2 10e9/L  FrStHsLb   Abs Immature Granulocytes 0.0 0 - 0.4 10e9/L  FrStHsLb            Basic metabolic panel [[id number]] (Abnormal)  Resulted: 07/03/18 0713, Result status: Final result    Ordering provider: Chris Ortiz DO  07/03/18 0001 Resulting lab: [facility]    Specimen Information    Type Source Collected On   Blood  07/03/18 0625          Components       Value Reference Range Flag Lab   Sodium 141 133 - 144 mmol/L  FrStHsLb   Potassium 3.6 3.4 - 5.3 mmol/L  FrStHsLb   Chloride 105 94 - 109 mmol/L  FrStHsLb   Carbon Dioxide 27 20 - 32 mmol/L  FrStHsLb   Anion Gap 9 3 - 14 mmol/L  FrStHsLb   Glucose 100 70 - 99 mg/dL H FrStHsLb   Urea Nitrogen 13 7 - 30 mg/dL  FrStHsLb   Creatinine 0.97 0.66 - 1.25 mg/dL  FrStHsLb   GFR Estimate 78 >60 mL/min/1.7m2  FrStHsLb   Comment:  Non  GFR Calc   GFR Estimate If Black >90 >60 mL/min/1.7m2  FrStHsLb   Comment:  African American GFR Calc   Calcium 8.0 8.5 - 10.1 mg/dL L FrStHsLb            Hepatic panel [[id number]] (Abnormal)  Resulted: 07/03/18 0713, Result status: Final result    Ordering provider: Chris Ortiz DO  07/03/18 0001 Resulting lab: [facility]    Specimen Information    Type Source Collected On   Blood  07/03/18 0625          Components       Value Reference Range Flag Lab   Bilirubin Direct 1.6 0.0 - 0.2 mg/dL H FrStHsLb   Bilirubin Total 2.3 0.2 - 1.3 mg/dL H FrStHsLb   Albumin 2.4 3.4 - 5.0 g/dL L FrStHsLb   Protein Total 6.1 6.8 - 8.8 g/dL L FrStHsLb   Alkaline Phosphatase 188 40 - 150 U/L H FrStHsLb    0 - 70 U/L H FrStHsLb   AST 99 0 - 45 U/L H FrStHsLb            CBC with platelets [[id number]] (Abnormal)  Resulted: 07/03/18 0644, Result status: Final result    Ordering provider: Chris Ortiz DO  07/03/18 0001 Resulting lab: [facility]    Specimen Information     Type Source Collected On   Blood  07/03/18 0625          Components       Value Reference Range Flag Lab   WBC 2.9 4.0 - 11.0 10e9/L L FrStHsLb   RBC Count 3.78 4.4 - 5.9 10e12/L L FrStHsLb   Hemoglobin 11.4 13.3 - 17.7 g/dL L FrStHsLb   Hematocrit 34.2 40.0 - 53.0 % L FrStHsLb   MCV 91 78 - 100 fl  FrStHsLb   MCH 30.2 26.5 - 33.0 pg  FrStHsLb   MCHC 33.3 31.5 - 36.5 g/dL  FrStHsLb   RDW 16.1 10.0 - 15.0 % H FrStHsLb   Platelet Count 87 150 - 450 10e9/L L FrStHsLb            UA with Microscopic [[id number]] (Abnormal)  Resulted: 07/02/18 1312, Result status: Final result    Ordering provider: Jany Willard MD  07/02/18 1215 Resulting lab: [facility]    Specimen Information    Type Source Collected On   Midstream Urine Urine clean catch 07/02/18 1258          Components       Value Reference Range Flag Lab   Color Urine Dark Yellow   FrStHsLb   Appearance Urine Clear   FrStHsLb   Glucose Urine Negative NEG^Negative mg/dL  FrStHsLb   Bilirubin Urine Small NEG^Negative A FrStHsLb   Comment:         This is an unconfirmed screening test result. A positive result may be false.   Ketones Urine 5 NEG^Negative mg/dL A FrStHsLb   Specific [place] Urine 1.016 1.003 - 1.035  FrStHsLb   Blood Urine Trace NEG^Negative A FrStHsLb   pH Urine 7.0 5.0 - 7.0 pH  FrStHsLb   Protein Albumin Urine 100 NEG^Negative mg/dL A FrStHsLb   Urobilinogen mg/dL 4.0 0.0 - 2.0 mg/dL H FrStHsLb   Nitrite Urine Negative NEG^Negative  FrStHsLb   Leukocyte Esterase Urine Negative NEG^Negative  FrStHsLb   Source Midstream Urine   FrStHsLb   WBC Urine 2 0 - 5 /HPF  FrStHsLb   RBC Urine 1 0 - 2 /HPF  FrStHsLb   Mucous Urine Present NEG^Negative /LPF A FrStHsLb            Lactic acid whole blood [[id number]]  Resulted: 07/02/18 1306, Result status: Final result    Ordering provider: Jany Willard MD  07/02/18 1253 Resulting lab: [facility]    Specimen Information    Type Source Collected On   Blood  07/02/18 1200           Components       Value Reference Range Flag Lab   Lactic Acid 1.1 0.7 - 2.0 mmol/L  FrStHsLb            Comprehensive metabolic panel [[id number]] (Abnormal)  Resulted: 07/02/18 1252, Result status: Final result    Ordering provider: Jany Willard MD  07/02/18 1215 Resulting lab: [facility]    Specimen Information    Type Source Collected On   Blood  07/02/18 1200          Components       Value Reference Range Flag Lab   Sodium 133 133 - 144 mmol/L  FrStHsLb   Potassium 4.2 3.4 - 5.3 mmol/L  FrStHsLb   Comment:  Specimen slightly hemolyzed, potassium may be falsely elevated   Chloride 98 94 - 109 mmol/L  FrStHsLb   Carbon Dioxide 25 20 - 32 mmol/L  FrStHsLb   Anion Gap 10 3 - 14 mmol/L  FrStHsLb   Glucose 100 70 - 99 mg/dL H FrStHsLb   Urea Nitrogen 15 7 - 30 mg/dL  FrStHsLb   Creatinine 0.91 0.66 - 1.25 mg/dL  FrStHsLb   GFR Estimate 84 >60 mL/min/1.7m2  FrStHsLb   Comment:  Non  GFR Calc   GFR Estimate If Black >90 >60 mL/min/1.7m2  FrStHsLb   Comment:  African American GFR Calc   Calcium 8.2 8.5 - 10.1 mg/dL L FrStHsLb   Bilirubin Total 2.3 0.2 - 1.3 mg/dL H FrStHsLb   Albumin 2.9 3.4 - 5.0 g/dL L FrStHsLb   Protein Total 7.3 6.8 - 8.8 g/dL  FrStHsLb   Alkaline Phosphatase 210 40 - 150 U/L H FrStHsLb    0 - 70 U/L H FrStHsLb    0 - 45 U/L H FrStHsLb            Lipase [[id number]]  Resulted: 07/02/18 1252, Result status: Final result    Ordering provider: Jany Willard MD  07/02/18 1215 Resulting lab: [facility]    Specimen Information    Type Source Collected On   Blood  07/02/18 1200          Components       Value Reference Range Flag Lab   Lipase 212 73 - 393 U/L  FrStHsLb            CBC with platelets differential [[id number]] (Abnormal)  Resulted: 07/02/18 1244, Result status: Final result    Ordering provider: Jany Willard MD  07/02/18 1215 Resulting lab: [facility]    Specimen Information    Type Source Collected On   Blood  07/02/18  "1200          Components       Value Reference Range Flag Lab   WBC 3.0 4.0 - 11.0 10e9/L L FrStHsLb   RBC Count 3.91 4.4 - 5.9 10e12/L L FrStHsLb   Hemoglobin 11.7 13.3 - 17.7 g/dL L FrStHsLb   Hematocrit 35.1 40.0 - 53.0 % L FrStHsLb   MCV 90 78 - 100 fl  FrStHsLb   MCH 29.9 26.5 - 33.0 pg  FrStHsLb   MCHC 33.3 31.5 - 36.5 g/dL  FrStHsLb   RDW 15.8 10.0 - 15.0 % H FrStHsLb   Platelet Count 104 150 - 450 10e9/L L FrStHsLb   Diff Method Automated Method   FrStHsLb   % Neutrophils 73.2 %  FrStHsLb   % Lymphocytes 16.5 %  FrStHsLb   % Monocytes 9.3 %  FrStHsLb   % Eosinophils 0.0 %  FrStHsLb   % Basophils 0.7 %  FrStHsLb   % Immature Granulocytes 0.3 %  FrStHsLb   Nucleated RBCs 0 0 /100  FrStHsLb   Absolute Neutrophil 2.1 1.6 - 8.3 10e9/L  FrStHsLb   Absolute Lymphocytes 0.5 0.8 - 5.3 10e9/L L FrStHsLb   Absolute Monocytes 0.3 0.0 - 1.3 10e9/L  FrStHsLb   Absolute Eosinophils 0.0 0.0 - 0.7 10e9/L  FrStHsLb   Absolute Basophils 0.0 0.0 - 0.2 10e9/L  FrStHsLb   Abs Immature Granulocytes 0.0 0 - 0.4 10e9/L  FrStHsLb   Absolute Nucleated RBC 0.0   FrStHsLb            Testing Performed By     Lab - Abbreviation Name Director Address Valid Date Range    14 - FrStHsLb [facility] Unknown [address] [place] [address] 05/08/15 1057 - Present    45 - LVM961 [facility] Unknown Unknown 01/28/02 0000 - Present    75 - Unknown [facility] Unknown [address] [facility] [address] 01/15/15 1019 - Present    88 - Unknown [facility] Unknown Unknown 10/30/02 0000 - Present    225 - Unknown [facility] Unknown [address] [facility] [address] 12/19/14 0954 - Present            Unresulted Labs (24h ago through future)    Start       Ordered    Unscheduled  Potassium  (Potassium Replacement - \"Standard\" - For K levels less than 3.4 mmol/L - UU,UR,UA,RH,SH,PH,WY )  CONDITIONAL (SPECIFY),   Routine     Comments:  Obtain Potassium Level for these " "conditions:  *IF no potassium result within 24 hours before initiation of order set, draw potassium level with next lab collect.    *2 HOURS AFTER last IV potassium replacement dose and 4 hours after an oral replacement dose.  *Next morning after potassium dose.     Repeat Potassium Replacement if necessary.    07/04/18 1223    Unscheduled  Magnesium  (Magnesium Replacement -  Adult - \"Standard\" - Replacement for all levels less than 1.6 mg/dL )  CONDITIONAL (SPECIFY),   Routine     Comments:  Obtain Magnesium Level for these conditions:  *IF no magnesium result within 24 hrs before initiation of order set, draw magnesium level with next lab collect.    *2 HOURS AFTER last magnesium replacement dose when magnesium replacement given for level less than 1.6   *Next morning after magnesium dose.     Repeat Magnesium Replacement if necessary.    07/04/18 1223         Imaging Results - 3 Days      US Abdomen Complete [[id number]]  Resulted: 07/04/18 1100, Result status: Final result    Ordering provider: Ramon Hackett DO  07/03/18 1433 Resulted by: Ana Fall MD    Performed: 07/04/18 0737 - 07/04/18 0814 Resulting lab: RADIOLOGY RESULTS    Narrative:       ULTRASOUND ABDOMEN COMPLETE 7/4/2018 8:14 AM     HISTORY: Elevated liver function tests.      COMPARISON: None.    FINDINGS:  Liver is normal in echogenicity without focal lesions.  Gallbladder is surgically absent. Extrahepatic bile duct normal in  diameter measuring up to 0.5 cm. Pancreas is normal where visualized.  Spleen is normal. Kidneys are normal in size. There is no  hydronephrosis. Proximal abdominal aorta is unremarkable. Trace  bilateral pleural effusions.      Impression:       IMPRESSION: Trace bilateral pleural effusions, otherwise unremarkable.  No evidence of biliary dilatation.    ANA FALL MD      XR Chest 2 Views [[id number]]  Resulted: 07/02/18 1452, Result status: Final result    Ordering provider: Jany Willard MD  07/02/18 1215 " Resulted by: Ana Silver MD    Performed: 07/02/18 1315 - 07/02/18 1347 Resulting lab: RADIOLOGY RESULTS    Narrative:       XR CHEST 2 VW 7/2/2018 1:47 PM    HISTORY: Cough and fever.    COMPARISON: None.    FINDINGS: Left basilar opacity. Right lung is clear. No pleural  effusion or pneumothorax. Normal heart size.      Impression:       IMPRESSION: Left basilar pneumonia.    ANA SILVER MD      CT Chest/Abdomen/Pelvis w Contrast [[id number]]  Resulted: 07/02/18 1415, Result status: Final result    Ordering provider: Jany Willard MD  07/02/18 1228 Resulted by: Mando Conley MD    Performed: 07/02/18 1338 - 07/02/18 1350 Resulting lab: RADIOLOGY RESULTS    Narrative:       CT CHEST/ABDOMEN/PELVIS WITH CONTRAST 7/2/2018 1:50 PM     HISTORY:  Fever, cough, nausea and vomiting, abdominal tenderness;  oral water prep.      TECHNIQUE: Axial images from the thoracic inlet to the symphysis are  performed with additional coronal reformatted images. 91 mL of Isovue  370 are given intravenously.  Radiation dose for this scan was reduced  using automated exposure control, adjustment of the mA and/or kV  according to patient size, or iterative reconstruction technique.    FINDINGS:     Chest: Subtle infiltrate is noted at the lateral left lung base  concerning for pneumonia. Atelectasis is also noted at the posterior  costophrenic angles bilaterally. No pleural or pericardial fluid. The  heart is normal in size. The esophagus is unremarkable. Thyroid gland  is prominent in size with a few tiny subcentimeter cysts of doubtful  clinical significance. No enlarged lymph nodes in the chest or  axillas. Thoracic and abdominal aorta are unremarkable. No evidence of  aneurysm or dissection. Central pulmonary arteries are patent.    Abdomen: Prior cholecystectomy changes. The liver, spleen, pancreas,  adrenal glands and kidneys are unremarkable. No hydronephrosis. Trace  amount of ascites is noted in the region of the  right paracolic  gutter. The bowel is normal in caliber without obstruction,  diverticulitis or appendicitis. No enlarged abdominal lymph nodes.    Pelvis: Prostate gland is mildly enlarged. The bladder and rectum are  unremarkable. No enlarged pelvic or inguinal lymph nodes. Trace amount  of free pelvic fluid is noted.      Impression:       IMPRESSION:  1. Bibasilar atelectasis with probable additional infiltrate left lung  base concerning for pneumonia. Lungs are otherwise clear.  2. Trace amount of fluid right paracolic gutter and pelvis without  obvious etiology. Abdominal and pelvic organs are within normal  limits. No bowel obstruction, diverticulitis or appendicitis.  3. Prior cholecystectomy changes.    CASSIA SU MD      Testing Performed By     Lab - Abbreviation Name Director Address Valid Date Range    104 - Rad Rslts RADIOLOGY RESULTS Unknown Unknown 05 1553 - Present               ECG/EMG Results      Echocardiogram Complete [[id number]]  Resulted: 18, Result status: Edited Result - FINAL    Ordering provider: Monae Chong DO  18 1143 Resulted by: Randolph Gaitan MD    Performed: 1846 - 18 0846 Resulting lab: RADIOLOGY RESULTS    Narrative:       [id number]  Formerly [facility]  [id number]  [id number]^ARLETTE^MONAE^FLOR           [facility]  Echocardiography Laboratory  [address] [place] [address]        Name: JAMIR BOYLE  MRN: [id number]  : 1955  Study Date: 2018 08:21 AM  Age: 63 yrs  Gender: Male  Patient Location: [facility]  Reason For Study: SOB  Ordering Physician: MONAE CHONG  Referring Physician: MONAE CHONG  Performed By: Veronica Walton RDCS     BSA: 2.1 m2  Height: 75 in  Weight: 181 lb  HR: 85  BP: 112/66 mmHg  _____________________________________________________________________________  __        Procedure  Complete Echo Adult. Contrast  Lumason.  _____________________________________________________________________________  __        Interpretation Summary     1. The left ventricle is normal in structure, function and size. The visual  ejection fraction is estimated at 60%.  2. The right ventricle is normal in structure, function and size.  3. No valve disease.     No previous echo for comparison.  _____________________________________________________________________________  __        Left Ventricle  The left ventricle is normal in structure, function and size. There is normal  left ventricular wall thickness. Left ventricular diastolic function is  normal. The visual ejection fraction is estimated at 60%. Diastolic Doppler  findings (E/E' ratio and/or other parameters) suggest left ventricular filling  pressures are normal. Normal left ventricular wall motion.     Right Ventricle  The right ventricle is normal in structure, function and size.     Atria  The left atrium is moderately dilated. Right atrial size is normal. There is  no atrial shunt seen.     Mitral Valve  The mitral valve is normal in structure and function. There is mild (1+)  mitral regurgitation.        Tricuspid Valve  The tricuspid valve is normal in structure and function. There is trace  tricuspid regurgitation. Right ventricular systolic pressure could not be  approximated due to inadequate tricuspid regurgitation.     Aortic Valve  The aortic valve is normal in structure and function.     Pulmonic Valve  The pulmonic valve is normal in structure and function.     Vessels  Normal ascending, transverse (arch), and descending aorta. The IVC is normal  in size and reactivity with respiration, suggesting normal central venous  pressure.     Pericardium  There is no pericardial effusion.        Rhythm  Sinus rhythm was noted.  _____________________________________________________________________________  __  MMode/2D Measurements & Calculations  IVSd: 0.96 cm     LVIDd: 5.6  cm  LVIDs: 3.6 cm  LVPWd: 1.3 cm  FS: 35.4 %  LV mass(C)d: 264.0 grams  LV mass(C)dI: 125.5 grams/m2  Ao root diam: 3.6 cm  LA dimension: 4.9 cm  asc Aorta Diam: 3.7 cm  LA/Ao: 1.4  LVOT diam: 2.3 cm  LVOT area: 4.3 cm2  LA Volume (BP): 99.1 ml  LA Volume Index (BP): 47.2 ml/m2  RWT: 0.48           Doppler Measurements & Calculations  MV E max yaakov: 99.4 cm/sec  MV A max yaakov: 85.9 cm/sec  MV E/A: 1.2  MV dec time: 0.23 sec  E/E' av.5  Lateral E/e': 7.1  Medial E/e': 9.9           _____________________________________________________________________________  __           Report approved by: Glendy Roque 2018 10:28 AM       1    Type Source Collected On     18 0821          View Image (below)              Encounter-Level Documents:     There are no encounter-level documents.      Order-Level Documents:     There are no order-level documents.

## 2018-07-02 NOTE — PROGRESS NOTES
RECEIVING UNIT ED HANDOFF REVIEW    ED Nurse Handoff Report was reviewed by: Vaishnavi Sullivan on July 2, 2018 at 6:10 PM

## 2018-07-02 NOTE — IP AVS SNAPSHOT
MRN:[id number]                      After Visit Summary   7/2/2018    Elmer Carter    MRN: [id number]           Thank you!     Thank you for choosing [facility] for your care. Our goal is always to provide you with excellent care. Hearing back from our patients is one way we can continue to improve our services. Please take a few minutes to complete the written survey that you may receive in the mail after you visit with us. Thank you!        Patient Information     Date Of Birth          1955        Designated Caregiver       Most Recent Value    Caregiver    Will someone help with your care after discharge? yes    Name of designated caregiver Henry (son)    Phone number of caregiver [phone]    Caregiver address [place]      About your hospital stay     You were admitted on:  July 2, 2018 You last received care in the:  [place] [address] [facility]    You were discharged on:  July 5, 2018        Reason for your hospital stay       Pneumonia                  Who to Call     For medical emergencies, please call 911.  For non-urgent questions about your medical care, please call your primary care provider or clinic, None          Attending Provider     Provider Specialty    Jany Willard MD Emergency Medicine    Chris Ortiz, DO Internal Medicine    aRmon Hackett, DO Internal Medicine       Primary Care Provider Fax #    Physician No Ref-Primary [phone]      After Care Instructions     Activity       Your activity upon discharge: activity as tolerated            Diet       Follow this diet upon discharge: Orders Placed This Encounter      Regular Diet Adult                  Follow-up Appointments     Follow-up and recommended labs and tests        Follow up with primary care provider, Physician No Ref-Primary, within 7 days for hospital follow- up.  The following labs/tests are recommended: cbc/bmp.                  Further instructions from your care  team       Pt has clothes/shoes in bedside dresser.    *No school or work until you have been without a fever for 24 hours with tylenol or motrin.  *Take medications as prescribed.  Ibuprofen for pain and fever.  Zofran for nausea. Augmentin and azithromycin.  *Follow-up with your doctor for a recheck in 2-3 days.    *Return if you develop difficulty breathing or swallowing, are unable to keep fluids down, faint or feel like you will faint or become worse in any way.    Discharge Instructions  Bronchitis, Pneumonia, Bronchospasm    You were seen today for a chest infection or inflammation. If your doctor decided this was due to a bacterial infection, you may need an antibiotic. Sometimes these are caused by a virus, and then an antibiotic will not help.     Return to the Emergency Department if:    Your breathing gets much worse.    You are very weak, or feel much more ill.    You develop new symptoms, such as chest pain.    You cough up blood.    You are vomiting enough that you can t keep fluids or your medicine down.    What can I do to help myself?    Fill any prescriptions the doctor gave you and take them right away--especially antibiotics. Be sure to finish the whole antibiotic prescription.    You may be given a prescription for an inhaler, which can help loosen tight air passages.  Use this as needed, but not more often than directed. Inhalers work much better when used with a spacer.     You may be given a prescription for a steroid to reduce inflammation. Used long-term, these can have many serious side effects, but for short courses these do not happen. You may notice restlessness or increased appetite.        You may use non-prescription cough or cold medicines. Cough medicines may help, but don t make the cough go away completely.     Avoid smoke, because this can make your symptoms worse. If you smoke, this may be a good time to quit! Consider using nicotine lozenges, gum, or patches to reduce  "cravings.     If you have a fever, Tylenol  (acetaminophen), Motrin  (ibuprofen), or Advil  (ibuprofen) may help bring fever down and may help you feel more comfortable. Be sure to read and follow the package directions, and ask your doctor if you have questions.    Be sure to get your flu shot each year.  The pneumonia shot can help prevent pneumonia.  Probiotics: If you have been given an antibiotic, you may want to also take a probiotic pill or eat yogurt with live cultures. Probiotics have \"good bacteria\" to help your intestines stay healthy. Studies have shown that probiotics help prevent diarrhea and other intestine problems (including C. diff infection) when you take antibiotics. You can buy these without a prescription in the pharmacy section of the store.     If your doctor has told you to follow-up at your clinic, be sure to call right away and go to your appointment.  If there is any problem with keeping your appointment, call your doctor or return to the Emergency Department.    If you were given a prescription for medicine here today, be sure to read all of the information (including the package insert) that comes with your prescription.  This will include important information about the medicine, its side effects, and any warnings that you need to know about.  The pharmacist who fills the prescription can provide more information and answer questions you may have about the medicine.  If you have questions or concerns that the pharmacist cannot address, please call or return to the Emergency Department.     Opioid Medication Information    Pain medications are among the most commonly prescribed medicines, so we are including this information for all our patients. If you did not receive pain medication or get a prescription for pain medicine, you can ignore it.     You may have been given a prescription for an opioid (narcotic) pain medicine and/or have received a pain medicine while here in the " Emergency Department. These medicines can make you drowsy or impaired. You must not drive, operate dangerous equipment, or engage in any other dangerous activities while taking these medications. If you drive while taking these medications, you could be arrested for DUI, or driving under the influence. Do not drink any alcohol while you are taking these medications.     Opioid pain medications can cause addiction. If you have a history of chemical dependency of any type, you are at a higher risk of becoming addicted to pain medications.  Only take these prescribed medications to treat your pain when all other options have been tried. Take it for as short a time and as few doses as possible. Store your pain pills in a secure place, as they are frequently stolen and provide a dangerous opportunity for children or visitors in your house to start abusing these powerful medications. We will not replace any lost or stolen medicine.  As soon as your pain is better, you should flush all your remaining medication.     Many prescription pain medications contain Tylenol  (acetaminophen), including Vicodin , Tylenol #3 , Norco , Lortab , and Percocet .  You should not take any extra pills of Tylenol  if you are using these prescription medications or you can get very sick.  Do not ever take more than 3000 mg of acetaminophen in any 24 hour period.    All opioids tend to cause constipation. Drink plenty of water and eat foods that have a lot of fiber, such as fruits, vegetables, prune juice, apple juice and high fiber cereal.  Take a laxative if you don t move your bowels at least every other day. Miralax , Milk of Magnesia, Colace , or Senna  can be used to keep you regular.      Remember that you can always come back to the Emergency Department if you are not able to see your regular doctor in the amount of time listed above, if you get any new symptoms, or if there is anything that worries you.            Pending Results      "Date and Time Order Name Status Description    2018 1943 Blood culture Preliminary     2018 1943 Blood culture Preliminary     2018 1218 Respiratory Virus Panel by PCR In process     2018 1152 Blood Morphology Pathologist Review In process     7/3/2018 1256 Hepatitis C Screen Reflex to HCV RNA Quant and Genotype In process     7/3/2018 1256 Hepatitis B surface antigen In process     7/3/2018 1256 Hepatitis B Surface Antibody In process     2018 1215 Blood culture Preliminary     2018 1215 Blood culture Preliminary             Statement of Approval     Ordered          18 1058  I have reviewed and agree with all the recommendations and orders detailed in this document.  EFFECTIVE NOW     Approved and electronically signed by:  Ramon Hackett DO             Admission Information     Date & Time Provider Department Dept. Phone    2018 Ramon Hackett DO [place] [address] [facility] [phone]      Your Vitals Were     Blood Pressure Pulse Temperature Respirations Height Weight    116/67 (BP Location: Left arm) 82 98.8  F (37.1  C) (Oral) 16 1.918 m (6' 3.5\") 82.1 kg (181 lb)    Pulse Oximetry BMI (Body Mass Index)                90% 22.32 kg/m2          [employer] Information     [employer] lets you send messages to your doctor, view your test results, renew your prescriptions, schedule appointments and more. To sign up, go to [email or web address] . Click on \"Log in\" on the left side of the screen, which will take you to the Welcome page. Then click on \"Sign up Now\" on the right side of the page.     You will be asked to enter the access code listed below, as well as some personal information. Please follow the directions to create your username and password.     Your access code is: [email or web address]  Expires: 10/3/2018 11:01 AM     Your access code will  in 90 days. If you need help or a new code, please call your [place] clinic or [phone].      "   Care EveryWhere ID     This is your Care EveryWhere ID. This could be used by other organizations to access your [facility] medical records  [id number]        Equal Access to Services     NICOLE BARRETT : Lorne Dumont, angela ferrer, emilianoshayne fisherihsanadelina burdentoadelina, waxblake samia sherifbunny burdenlele campos aubrie arreola. So [facility] [phone].    ATENCIÓN: Si habla español, tiene a rosenberg disposición servicios gratuitos de asistencia lingüística. Llame al [phone].    We comply with applicable federal civil rights laws and [place] laws. We do not discriminate on the basis of race, color, national origin, age, disability, sex, sexual orientation, or gender identity.               Review of your medicines      START taking        Dose / Directions    amoxicillin-clavulanate 875-125 MG per tablet   Commonly known as:  AUGMENTIN   Used for:  Pneumonia of left lower lobe due to infectious organism (H)        Dose:  1 tablet   Take 1 tablet by mouth 2 times daily   Quantity:  14 tablet   Refills:  0       ondansetron 4 MG ODT tab   Commonly known as:  ZOFRAN ODT        Dose:  4-8 mg   Take 1-2 tablets (4-8 mg) by mouth every 8 hours as needed for nausea   Quantity:  30 tablet   Refills:  0         CONTINUE these medicines which have NOT CHANGED        Dose / Directions    CELEBREX PO        Dose:  100 mg   Take 100 mg by mouth 2 times daily as needed for moderate pain   Refills:  0       FLOMAX 0.4 MG capsule   Generic drug:  tamsulosin        Dose:  0.4 mg   Take 0.4 mg by mouth At Bedtime   Refills:  0       OMEPRAZOLE PO        Dose:  20 mg   Take 20 mg by mouth daily as needed (Takes when taking ibuprofen)   Refills:  0       SYNTHROID PO        Dose:  75 mcg   Take 75 mcg by mouth daily   Refills:  0       VITAMIN D (CHOLECALCIFEROL) PO   Notes to Patient:  Resume home dose         Dose:  1000 Units   Take 1,000 Units by mouth daily   Refills:  0         STOP taking     IBUPROFEN PO           TURMERIC CURCUMIN PO                 Where to get your medicines      These medications were sent to [facility] [place], [place] - [address]  [address] [place] [address]     Phone:  [phone]     amoxicillin-clavulanate 875-125 MG per tablet    ondansetron 4 MG ODT tab                Protect others around you: Learn how to safely use, store and throw away your medicines at www.disposemymeds.org.        ANTIBIOTIC INSTRUCTION     You've Been Prescribed an Antibiotic - Now What?  Your healthcare team thinks that you or your loved one might have an infection. Some infections can be treated with antibiotics, which are powerful, life-saving drugs. Like all medications, antibiotics have side effects and should only be used when necessary. There are some important things you should know about your antibiotic treatment.      Your healthcare team may run tests before you start taking an antibiotic.    Your team may take samples (e.g., from your blood, urine or other areas) to run tests to look for bacteria. These test can be important to determine if you need an antibiotic at all and, if you do, which antibiotic will work best.      Within a few days, your healthcare team might change or even stop your antibiotic.    Your team may start you on an antibiotic while they are working to find out what is making you sick.    Your team might change your antibiotic because test results show that a different antibiotic would be better to treat your infection.    In some cases, once your team has more information, they learn that you do not need an antibiotic at all. They may find out that you don't have an infection, or that the antibiotic you're taking won't work against your infection. For example, an infection caused by a virus can't be treated with antibiotics. Staying on an antibiotic when you don't need it is more likely to be harmful than helpful.      You may experience side effects from your antibiotic.    Like  all medications, antibiotics have side effects. Some of these can be serious.    Let you healthcare team know if you have any known allergies when you are admitted to the hospital.    One significant side effect of nearly all antibiotics is the risk of severe and sometimes deadly diarrhea caused by Clostridium difficile (C. Difficile). This occurs when a person takes antibiotics because some good germs are destroyed. Antibiotic use allows C. diificile to take over, putting patients at high risk for this serious infection.    As a patient or caregiver, it is important to understand your or your loved one's antibiotic treatment. It is especially important for caregivers to speak up when patients can't speak for themselves. Here are some important questions to ask your healthcare team.    What infection is this antibiotic treating and how do you know I have that infection?    What side effects might occur from this antibiotic?    How long will I need to take this antibiotic?    Is it safe to take this antibiotic with other medications or supplements (e.g., vitamins) that I am taking?     Are there any special directions I need to know about taking this antibiotic? For example, should I take it with food?    How will I be monitored to know whether my infection is responding to the antibiotic?    What tests may help to make sure the right antibiotic is prescribed for me?      Information provided by:  www.cdc.gov/getsmart  U.S. Department of Health and Human Services  Centers for disease Control and Prevention  National Center for Emerging and Zoonotic Infectious Diseases  Division of Healthcare Quality Promotion             Medication List: This is a list of all your medications and when to take them. Check marks below indicate your daily home schedule. Keep this list as a reference.      Medications           Morning Afternoon Evening Bedtime As Needed    amoxicillin-clavulanate 875-125 MG per tablet   Commonly known  as:  AUGMENTIN   Take 1 tablet by mouth 2 times daily                                      CELEBREX PO   Take 100 mg by mouth 2 times daily as needed for moderate pain                                   FLOMAX 0.4 MG capsule   Take 0.4 mg by mouth At Bedtime   Last time this was given:  0.4 mg on 7/4/2018  7:37 PM   Generic drug:  tamsulosin   Next Dose Due:  7/5/2018 bedtime.                                    OMEPRAZOLE PO   Take 20 mg by mouth daily as needed (Takes when taking ibuprofen)                                   ondansetron 4 MG ODT tab   Commonly known as:  ZOFRAN ODT   Take 1-2 tablets (4-8 mg) by mouth every 8 hours as needed for nausea   Last time this was given:  4 mg on 7/4/2018  2:48 PM                                   SYNTHROID PO   Take 75 mcg by mouth daily   Last time this was given:  75 mcg on 7/5/2018  7:50 AM   Next Dose Due:  7/6/2018 before breakfast                                    VITAMIN D (CHOLECALCIFEROL) PO   Take 1,000 Units by mouth daily   Notes to Patient:  Resume home dose

## 2018-07-02 NOTE — PHARMACY-ADMISSION MEDICATION HISTORY
Admission medication history interview status for the 7/2/2018  admission is complete. See EPIC admission navigator for prior to admission medications     Medication history source reliability:Good    Actions taken by pharmacist (provider contacted, etc):None     Additional medication history information not noted on PTA med list :None    Medication reconciliation/reorder completed by provider prior to medication history? No    Time spent in this activity: 15min      Prior to Admission medications    Medication Sig Last Dose Taking? Auth Provider   amoxicillin-clavulanate (AUGMENTIN) 875-125 MG per tablet Take 1 tablet by mouth 2 times daily for 7 days  Yes Jany Willard MD   azithromycin (ZITHROMAX Z-SOLIS) 250 MG tablet First dose given in the ED  Yes Jany Willard MD   Celecoxib (CELEBREX PO) Take 100 mg by mouth 2 times daily as needed for moderate pain Past Month at Unknown time Yes Unknown, Entered By History   IBUPROFEN PO Take 400 mg by mouth every 6 hours as needed for moderate pain Past Week at Unknown time Yes Unknown, Entered By History   Levothyroxine Sodium (SYNTHROID PO) Take 75 mcg by mouth daily  7/2/2018 at am Yes Reported, Patient   OMEPRAZOLE PO Take 20 mg by mouth daily as needed (Takes when taking ibuprofen) Past Week at Unknown time Yes Unknown, Entered By History   ondansetron (ZOFRAN ODT) 4 MG ODT tab Take 1-2 tablets (4-8 mg) by mouth every 8 hours as needed for nausea  Yes Jany Willard MD   tamsulosin (FLOMAX) 0.4 MG capsule Take 0.4 mg by mouth At Bedtime  7/1/2018 at hs Yes Reported, Patient   TURMERIC CURCUMIN PO Take by mouth daily 7/2/2018 at Unknown time Yes Unknown, Entered By History   VITAMIN D, CHOLECALCIFEROL, PO Take 1,000 Units by mouth daily 7/2/2018 at Unknown time Yes Unknown, Entered By History

## 2018-07-02 NOTE — IP AVS SNAPSHOT
Elmer Boyle #[id number] (CSN: [id number])  (63 year old M)  (Adm: 18)     [id number]               [place] [address] MEDICAL SPECIALTY UNIT: [phone]            Patient Demographics     Patient Name Sex          Age SSN Address Phone    Elmer Boyle Male 1955 (63 year old) [id number] [place] [address] [id number] [phone] (Home)  [phone] (Mobile)      Emergency Contact(s)     Name Relation Home Work Mobile    PARMINDER BOYLE Spouse [phone]      LUIS BOYLE Son [phone]        Admission Information     Attending Provider Admitting Provider Admission Type Admission Date/Time    Ramon Hackett, Ramon Thompson,  Emergency 18  1145    Discharge Date Hospital Service Auth/Cert Status Service Area     Hospitalist [facility] SERVICES    Unit Room/Bed Admission Status       [facility] MED SPEC UNIT  Admission (Confirmed)       Admission     Complaint    CAP (community acquired pneumonia), Pneumonia      Hospital Account     Name Acct ID Class Status Primary Coverage    Elmer Boyle BRYAN [id number] Inpatient Open MEDICARE - MEDICARE            Guarantor Account (for Hospital Account #[id number])     Name Relation to Pt Service Area Active? Acct Type    Usman Boylenataliya ADAMS Self FCS Yes Personal/Family    Address Phone          [address] [place] [address], [place] [id number] [phone](H)              Coverage Information (for Hospital Account #[id number])     1. MEDICARE/MEDICARE     F/O Payor/Plan Precert #    MEDICARE/MEDICARE     Subscriber Subscriber #    Boyle, Elmer ADAMS [id number]    Address Phone    ATTN CLAIMS  [address]  [facility] [place] [address] [phone]          2. COMMERCIAL/OTHER     F/O Payor/Plan Precert #    COMMERCIAL/OTHER     Subscriber Subscriber #    BoyleElmer [id number]    Address Phone    [address]  [place] [address]                                                        "INTERAGENCY TRANSFER FORM - PHYSICIAN ORDERS   7/2/2018                       [place] [address] MEDICAL SPECIALTY UNIT: [phone]            Attending Provider: Ramon Hackett DO     Allergies:  No Known Allergies    Infection:  None   Service:  HOSPITALIST    Ht:  1.918 m (6' 3.5\")   Wt:  82.1 kg (181 lb)   Admission Wt:  82.1 kg (181 lb)    BMI:  22.32 kg/m 2   BSA:  2.09 m 2            ED Clinical Impression     Diagnosis Description Comment Added By Time Added    Pneumonia of left lower lobe due to infectious organism (H) [J18.1] Pneumonia of left lower lobe due to infectious organism (H) [J18.1]  Jany Willard MD 7/2/2018  3:45 PM    Nausea [R11.0] Nausea [R11.0]  Jany Willard MD 7/2/2018  3:45 PM    Elevated bilirubin [R17] Elevated bilirubin [R17]  Jany Willard MD 7/2/2018  3:48 PM    Elevated liver enzymes [R74.8] Elevated liver enzymes [R74.8]  Jany Willard MD 7/2/2018  3:48 PM    Other pancytopenia (H) [D61.818] Other pancytopenia (H) [D61.818]  Jany Willard MD 7/2/2018  3:48 PM      Hospital Problems as of 7/5/2018              Priority Class Noted POA    CAP (community acquired pneumonia) Medium  7/2/2018 Yes    Pneumonia Medium  7/3/2018 Yes      Non-Hospital Problems as of 7/5/2018     None      Code Status History     Date Active Date Inactive Code Status Order ID Comments User Context    7/5/2018 10:58 AM  Full Code [id number]  Ramon Hackett DO Outpatient    7/2/2018  7:25 PM 7/5/2018 10:58 AM Full Code [id number]  Chris Ortiz DO Inpatient      Current Code Status     Date Active Code Status Order ID Comments User Context       Prior      Summary of Visit     Reason for your hospital stay       Pneumonia                Medication Review      START taking        Dose / Directions Comments    amoxicillin-clavulanate 875-125 MG per tablet   Commonly known as:  AUGMENTIN   Used for:  Pneumonia of left lower lobe due to infectious organism (H)        Dose:  1 tablet   Take 1 tablet by " mouth 2 times daily   Quantity:  14 tablet   Refills:  0        ondansetron 4 MG ODT tab   Commonly known as:  ZOFRAN ODT        Dose:  4-8 mg   Take 1-2 tablets (4-8 mg) by mouth every 8 hours as needed for nausea   Quantity:  30 tablet   Refills:  0          CONTINUE these medications which have NOT CHANGED        Dose / Directions Comments    CELEBREX PO        Dose:  100 mg   Take 100 mg by mouth 2 times daily as needed for moderate pain   Refills:  0        FLOMAX 0.4 MG capsule   Generic drug:  tamsulosin        Dose:  0.4 mg   Take 0.4 mg by mouth At Bedtime   Refills:  0        OMEPRAZOLE PO        Dose:  20 mg   Take 20 mg by mouth daily as needed (Takes when taking ibuprofen)   Refills:  0        SYNTHROID PO        Dose:  75 mcg   Take 75 mcg by mouth daily   Refills:  0        VITAMIN D (CHOLECALCIFEROL) PO   Notes to Patient:  Resume home dose         Dose:  1000 Units   Take 1,000 Units by mouth daily   Refills:  0          STOP taking     IBUPROFEN PO           TURMERIC CURCUMIN PO                   After Care     Activity       Your activity upon discharge: activity as tolerated       Diet       Follow this diet upon discharge: Orders Placed This Encounter      Regular Diet Adult               Further instructions from your care team       Pt has clothes/shoes in bedside dresser.    *No school or work until you have been without a fever for 24 hours with tylenol or motrin.  *Take medications as prescribed.  Ibuprofen for pain and fever.  Zofran for nausea. Augmentin and azithromycin.  *Follow-up with your doctor for a recheck in 2-3 days.    *Return if you develop difficulty breathing or swallowing, are unable to keep fluids down, faint or feel like you will faint or become worse in any way.    Discharge Instructions  Bronchitis, Pneumonia, Bronchospasm    You were seen today for a chest infection or inflammation. If your doctor decided this was due to a bacterial infection, you may need an  "antibiotic. Sometimes these are caused by a virus, and then an antibiotic will not help.     Return to the Emergency Department if:    Your breathing gets much worse.    You are very weak, or feel much more ill.    You develop new symptoms, such as chest pain.    You cough up blood.    You are vomiting enough that you can t keep fluids or your medicine down.    What can I do to help myself?    Fill any prescriptions the doctor gave you and take them right away--especially antibiotics. Be sure to finish the whole antibiotic prescription.    You may be given a prescription for an inhaler, which can help loosen tight air passages.  Use this as needed, but not more often than directed. Inhalers work much better when used with a spacer.     You may be given a prescription for a steroid to reduce inflammation. Used long-term, these can have many serious side effects, but for short courses these do not happen. You may notice restlessness or increased appetite.        You may use non-prescription cough or cold medicines. Cough medicines may help, but don t make the cough go away completely.     Avoid smoke, because this can make your symptoms worse. If you smoke, this may be a good time to quit! Consider using nicotine lozenges, gum, or patches to reduce cravings.     If you have a fever, Tylenol  (acetaminophen), Motrin  (ibuprofen), or Advil  (ibuprofen) may help bring fever down and may help you feel more comfortable. Be sure to read and follow the package directions, and ask your doctor if you have questions.    Be sure to get your flu shot each year.  The pneumonia shot can help prevent pneumonia.  Probiotics: If you have been given an antibiotic, you may want to also take a probiotic pill or eat yogurt with live cultures. Probiotics have \"good bacteria\" to help your intestines stay healthy. Studies have shown that probiotics help prevent diarrhea and other intestine problems (including C. diff infection) when you take " antibiotics. You can buy these without a prescription in the pharmacy section of the store.     If your doctor has told you to follow-up at your clinic, be sure to call right away and go to your appointment.  If there is any problem with keeping your appointment, call your doctor or return to the Emergency Department.    If you were given a prescription for medicine here today, be sure to read all of the information (including the package insert) that comes with your prescription.  This will include important information about the medicine, its side effects, and any warnings that you need to know about.  The pharmacist who fills the prescription can provide more information and answer questions you may have about the medicine.  If you have questions or concerns that the pharmacist cannot address, please call or return to the Emergency Department.     Opioid Medication Information    Pain medications are among the most commonly prescribed medicines, so we are including this information for all our patients. If you did not receive pain medication or get a prescription for pain medicine, you can ignore it.     You may have been given a prescription for an opioid (narcotic) pain medicine and/or have received a pain medicine while here in the Emergency Department. These medicines can make you drowsy or impaired. You must not drive, operate dangerous equipment, or engage in any other dangerous activities while taking these medications. If you drive while taking these medications, you could be arrested for DUI, or driving under the influence. Do not drink any alcohol while you are taking these medications.     Opioid pain medications can cause addiction. If you have a history of chemical dependency of any type, you are at a higher risk of becoming addicted to pain medications.  Only take these prescribed medications to treat your pain when all other options have been tried. Take it for as short a time and as few doses as  possible. Store your pain pills in a secure place, as they are frequently stolen and provide a dangerous opportunity for children or visitors in your house to start abusing these powerful medications. We will not replace any lost or stolen medicine.  As soon as your pain is better, you should flush all your remaining medication.     Many prescription pain medications contain Tylenol  (acetaminophen), including Vicodin , Tylenol #3 , Norco , Lortab , and Percocet .  You should not take any extra pills of Tylenol  if you are using these prescription medications or you can get very sick.  Do not ever take more than 3000 mg of acetaminophen in any 24 hour period.    All opioids tend to cause constipation. Drink plenty of water and eat foods that have a lot of fiber, such as fruits, vegetables, prune juice, apple juice and high fiber cereal.  Take a laxative if you don t move your bowels at least every other day. Miralax , Milk of Magnesia, Colace , or Senna  can be used to keep you regular.      Remember that you can always come back to the Emergency Department if you are not able to see your regular doctor in the amount of time listed above, if you get any new symptoms, or if there is anything that worries you.            Follow-Up Appointment Instructions     Follow-up and recommended labs and tests        Follow up with primary care provider, Physician No Ref-Primary, within 7 days for hospital follow- up.  The following labs/tests are recommended: cbc/bmp.             Statement of Approval     Ordered          07/05/18 1058  I have reviewed and agree with all the recommendations and orders detailed in this document.  EFFECTIVE NOW     Approved and electronically signed by:  Ramon Hackett DO                                                 INTERAGENCY TRANSFER FORM - NURSING   7/2/2018                       [place] [address] MEDICAL SPECIALTY UNIT: [phone]            Attending Provider: Lew  "Ramon Kim,      Allergies:  No Known Allergies    Infection:  None   Service:  HOSPITALIST    Ht:  1.918 m (6' 3.5\")   Wt:  82.1 kg (181 lb)   Admission Wt:  82.1 kg (181 lb)    BMI:  22.32 kg/m 2   BSA:  2.09 m 2            Advance Directives        Scanned docmt in ACP Activity?           No scanned doc        Immunizations     None      ASSESSMENT     Discharge Profile Flowsheet     EXPECTED DISCHARGE     SKIN      Expected Discharge Date  07/06/18 07/05/18 0933   Inspection of bony prominences  Full 07/05/18 0838    GASTROINTESTINAL (ADULT,PEDIATRIC,OB)     Inspection under devices  Full 07/05/18 0838    GI WDL  WDL 07/05/18 0838   Skin WDL  ex 07/05/18 0838    Last Bowel Movement  07/03/18 07/05/18 0838   Skin Color/Characteristics  other (see comments) 07/05/18 0838    GI Signs/Symptoms  constipation 07/04/18 0006   Skin Integrity  rash(es) 07/05/18 0838    Passing flatus  yes 07/05/18 0838   SAFETY      COMMUNICATION ASSESSMENT     Safety WDL  WDL 07/05/18 0838    Patient's communication style  spoken language (English or Bilingual) 07/02/18 1130   All Alarms  none present 07/05/18 0838                 Assessment WDL (Within Defined Limits) Definitions           Safety WDL     Effective: 09/28/15    Row Information: <b>WDL Definition:</b> Bed in low position, wheels locked; call light in reach; upper side rails up x 2; ID band on<br> <font color=\"gray\"><i>Item=AS safety wdl>>List=AS safety wdl>>Version=F14</i></font>      Skin WDL     Effective: 09/28/15    Row Information: <b>WDL Definition:</b> Warm; dry; intact; elastic; without discoloration; pressure points without redness<br> <font color=\"gray\"><i>Item=AS skin wdl>>List=AS skin wdl>>Version=F14</i></font>      Vitals     Vital Signs Flowsheet     VITAL SIGNS     Side Effects Monitoring: Respiratory Depth  N 07/05/18 0752    Temp  98.8  F (37.1  C) 07/05/18 0926   Side Effects Monitoring: Sedation Level  1 07/05/18 0752    Temp src  Oral 07/05/18 " "0926   HEIGHT AND WEIGHT      Resp  16 07/05/18 0735   Height  1.918 m (6' 3.5\") 07/02/18 1134    Pulse  82 07/05/18 0735   Height Method  Stated 07/02/18 1134    Heart Rate  82 07/05/18 0735   Weight  82.1 kg (181 lb) 07/02/18 1134    Pulse/Heart Rate Source  Monitor 07/05/18 0735   BSA (Calculated - sq m)  2.09 07/02/18 1134    BP  116/67 07/05/18 0735   BMI (Calculated)  22.37 07/02/18 1134    BP Location  Left arm 07/05/18 0735   PEREZ COMA SCALE      OXYGEN THERAPY     Best Eye Response  4-->(E4) spontaneous 07/05/18 0752    SpO2  90 % 07/05/18 0936   Best Motor Response  6-->(M6) obeys commands 07/05/18 0752    O2 Device  None (Room air) 07/05/18 0936   Best Verbal Response  4-->(V4) confused 07/05/18 0752    Oxygen Delivery  2 LPM 07/05/18 0735   [employer] Coma Scale Score  14 07/05/18 0752    PAIN/COMFORT     POSITIONING      Patient Currently in Pain  denies 07/05/18 0756   Body Position  independently positioning 07/05/18 0838    0-10 Pain Scale  2 07/04/18 2101   Chair  Upright in chair 07/03/18 1902    Pain Location  Head 07/04/18 0120   Head of Bed (HOB)  HOB at 30 degrees 07/05/18 0838    Pain Descriptors  Headache 07/04/18 2102   Positioning/Transfer Devices  pillows;in use 07/05/18 0838    Pain Intervention(s)  Medication (See eMAR) 07/04/18 2102   DAILY CARE      Response to Interventions  Decrease in pain 07/04/18 1609   Activity Management  ambulated in echols;ambulated in room 07/05/18 0936    ANALGESIA SIDE EFFECTS MONITORING     Activity Assistance Provided  independent 07/05/18 0936    Side Effects Monitoring: Respiratory Quality  R 07/05/18 0752                 Patient Lines/Drains/Airways Status    Active LINES/DRAINS/AIRWAYS     Name: Placement date: Placement time: Site: Days: Last dressing change:    Peripheral IV 07/02/18 Right Upper forearm 07/02/18   1212   Upper forearm   2     Rash back                     Patient Lines/Drains/Airways Status    Active PICC/CVC     None          "   Intake/Output Detail Report     Date Intake     Output    Shift P.O. I.V. IV Piggyback Total Total       Noc 07/03/18 2300 - 07/04/18 0659 0 1000 -- 1000 -- 1000    Day 07/04/18 0700 - 07/04/18 1459 -- -- -- -- -- 0    Yvrose 07/04/18 1500 - 07/04/18 2259 -- -- -- -- -- 0    Noc 07/04/18 2300 - 07/05/18 0659 -- -- -- -- -- 0    Day 07/05/18 0700 - 07/05/18 1459 360 -- -- 360 -- 360      Last Void/BM       Most Recent Value    Urine Occurrence 1 at 07/05/2018 0936    Stool Occurrence       Case Management/Discharge Planning     Case Management/Discharge Planning Flowsheet     LIVING ENVIRONMENT     QUESTION TO PATIENT:  Has a member of your family or a partner(now or in the past) intimidated, hurt, manipulated, or controlled you in any way?  patient declined to answer or is unable to answer 07/02/18 1136    Lives With  spouse 07/03/18 1602   QUESTION TO PATIENT: Do you feel safe going back to the place where you are living?  patient declined to answer or is unable to answer 07/02/18 1136    COPING/STRESS     OBSERVATION: Is there reason to believe there has been maltreatment of a vulnerable adult (ie. Physical/Sexual/Emotional abuse, self neglect, lack of adequate food, shelter, medical care, or financial exploitation)?  no 07/02/18 1136    Major Change/Loss/Stressor  hospitalization 07/03/18 1604   OTHER      EXPECTED DISCHARGE     Are you depressed or being treated for depression?  No 07/02/18 1909    Expected Discharge Date  07/06/18 07/05/18 0933   HOMICIDE RISK      ABUSE RISK SCREEN     Feels Like Hurting Others  no 07/02/18 1136                  [place] [address] MEDICAL SPECIALTY UNIT: [phone]            Medication Administration Report for Elmer Carter as of 07/05/18 1121   Legend:    Given Hold Not Given Due Canceled Entry Other Actions    Time Time (Time) Time  Time-Action       Inactive    Active    Linked        Medications 06/29/18 06/30/18 07/01/18 07/02/18 07/03/18 07/04/18 07/05/18     albuterol neb solution 2.5 mg  Dose: 2.5 mg  Freq: 4 TIMES DAILY Route: NEBULIZATION  Start: 07/02/18 2000   Admin. Amount: 2.5 mg = 3 mL Conc: 2.5 mg/3 mL  Last Admin: 07/05/18 0802  Dispense Loc:  ADS 66D  Volume: 3 mL        1951 (2.5 mg)-Given        0829 (2.5 mg)-Given       1216 (2.5 mg)-Given       1522 (2.5 mg)-Given       1938 (2.5 mg)-Given        0709 (2.5 mg)-Given       1152 (2.5 mg)-Given       1505 (2.5 mg)-Given       1927 (2.5 mg)-Given        0802 (2.5 mg)-Given       [ ] 1100       [ ] 1500       [ ] 1900           albuterol neb solution 2.5 mg  Dose: 2.5 mg  Freq: EVERY 2 HOURS PRN Route: NEBULIZATION  PRN Reason: other  PRN Comment: dyspnea  Start: 07/02/18 1923   Admin. Amount: 2.5 mg = 3 mL Conc: 2.5 mg/3 mL  Dispense Loc:  ADS 66D  Volume: 3 mL               bisacodyl (DULCOLAX) Suppository 10 mg  Dose: 10 mg  Freq: DAILY PRN Route: RE  PRN Reason: constipation  Start: 07/05/18 0733   Admin Instructions: Hold for loose stools.  This is the third step of a three step constipation treatment.    Admin. Amount: 1 suppository (1 × 10 mg suppository)  Dispense Loc:  ADS 66D               cefTRIAXone (ROCEPHIN) 2 g vial to attach to  ml bag for ADULTS or NS 50 ml bag for PEDS  Dose: 2 g  Freq: EVERY 24 HOURS Route: IV  Indications of Use: COMMUNITY ACQUIRED PNEUMONIA  Start: 07/03/18 1500   Admin Instructions: FIRST DOSE STAT.    Admin. Amount: 2 g  Last Admin: 07/04/18 1441  Dispense Loc:  ADS 66D  Infused Over: 30 Minutes  Volume: 20 mL   Current Line: Peripheral IV 07/02/18 Right Upper forearm        1448 (2 g)-New Bag        1441 (2 g)-New Bag        [ ] 1500           HYDROcodone-acetaminophen (NORCO) 5-325 MG per tablet 1-2 tablet  Dose: 1-2 tablet  Freq: EVERY 6 HOURS PRN Route: PO  PRN Reason: moderate to severe pain  Start: 07/04/18 1402   Admin Instructions: Maximum acetaminophen dose from all sources= 75 mg/kg/day not to exceed 4 grams    Admin. Amount: 1-2 tablet  Last  Admin: 07/04/18 2101  Dispense Loc: [facility] [address]          1448 (1 tablet)-Given       2101 (1 tablet)-Given            levothyroxine (SYNTHROID/LEVOTHROID) tablet 75 mcg  Dose: 75 mcg  Freq: DAILY Route: PO  Start: 07/05/18 0900   Admin. Amount: 1 tablet (1 × 75 mcg tablet)  Last Admin: 07/05/18 0750  Dispense Loc: [address]           0750 (75 mcg)-Given                  magnesium sulfate 4 g in 100 mL sterile water (premade)  Dose: 4 g  Freq: EVERY 4 HOURS PRN Route: IV  PRN Reason: magnesium supplementation  Start: 07/04/18 1223   Admin Instructions: For serum Mg++ less than 1.6 mg/dL  Give 4 g and recheck magnesium level 2 hours after dose, and next AM.    Admin. Amount: 4 g = 100 mL Conc: 4 g/100 mL  Dispense Loc: Contact Rx for dose  Infused Over: 120 Minutes  Volume: 100 mL               melatonin tablet 1 mg  Dose: 1 mg  Freq: AT BEDTIME PRN Route: PO  PRN Reason: sleep  Start: 07/02/18 1923   Admin Instructions: Do not give unless at least 6 hours of uninterrupted sleep is expected.    Admin. Amount: 1 tablet (1 × 1 mg tablet)  Last Admin: 07/04/18 2101  Dispense Loc: [address]          2101 (1 mg)-Given            naloxone (NARCAN) injection 0.1-0.4 mg  Dose: 0.1-0.4 mg  Freq: EVERY 2 MIN PRN Route: IV  PRN Reason: opioid reversal  Start: 07/02/18 1923   Admin Instructions: For respiratory rate LESS than or EQUAL to 8.  Partial reversal dose:  0.1 mg titrated q 2 minutes for Analgesia Side Effects Monitoring Sedation Level of 3 (frequently drowsy, arousable, drifts to sleep during conversation).Full reversal dose:  0.4 mg bolus for Analgesia Side Effects Monitoring Sedation Level of 4 (somnolent, minimal or no response to stimulation).  For ordered doses up to 2mg give IVP. Give each 0.4mg over 15 seconds in emergency situations. For non-emergent situations further dilute in 9mL of NS to facilitate titration of response.    Admin. Amount: 0.1-0.4 mg = 0.25-1 mL Conc: 0.4 mg/mL  Dispense Loc:  ADS  66D  Volume: 1 mL               omeprazole (priLOSEC) CR capsule 20 mg  Dose: 20 mg  Freq: DAILY PRN Route: PO  PRN Comment: Takes when taking ibuprofen  Start: 07/05/18 0733   Admin. Amount: 1 capsule (1 × 20 mg capsule)  Dispense Loc:  ADS 66D               ondansetron (ZOFRAN-ODT) ODT tab 4 mg  Dose: 4 mg  Freq: EVERY 6 HOURS PRN Route: PO  PRN Reasons: nausea,vomiting  Start: 07/02/18 1923   Admin Instructions: This is Step 1 of nausea and vomiting management.  If nausea not resolved in 15 minutes, go to Step 2 prochlorperazine (COMPAZINE). Do not push through foil backing. Peel back foil and gently remove. Place on tongue immediately. Administration with liquid unnecessary  With dry hands, peel back foil backing and gently remove tablet; do not push oral disintegrating tablet through foil backing; administer immediately on tongue and oral disintegrating tablet dissolves in seconds; then swallow with saliva; liquid not required.    Admin. Amount: 1 tablet (1 × 4 mg tablet)  Last Admin: 07/04/18 1448  Dispense Loc:  ADS 66D          1448 (4 mg)-Given           Or  ondansetron (ZOFRAN) injection 4 mg  Dose: 4 mg  Freq: EVERY 6 HOURS PRN Route: IV  PRN Reasons: nausea,vomiting  Start: 07/02/18 1923   Admin Instructions: This is Step 1 of nausea and vomiting management.  If nausea not resolved in 15 minutes, go to Step 2 prochlorperazine (COMPAZINE).  Irritant. For ordered doses up to 4 mg, give IV Push undiluted over 2-5 minutes.    Admin. Amount: 4 mg = 2 mL Conc: 4 mg/2 mL  Dispense Loc: [facility] 66D  Infused Over: 2-5 Minutes  Volume: 2 mL                      polyethylene glycol (MIRALAX/GLYCOLAX) Packet 17 g  Dose: 17 g  Freq: DAILY PRN Route: PO  PRN Reason: constipation  Start: 07/05/18 0733   Admin Instructions: Give in 8oz of  water, juice, or soda. Hold for loose stools.  This is the second step of a three step constipation treatment.  1 Packet = 17 grams. Mixed prescribed dose in 8 ounces of water.  Follow with 8 oz. of water.    Admin. Amount: 17 g  Dispense Loc:  ADS 66D               polyethylene glycol (MIRALAX/GLYCOLAX) Packet 17 g  Dose: 17 g  Freq: DAILY Route: PO  Start: 07/05/18 0900   Admin Instructions: 1 Packet = 17 grams. Mixed prescribed dose in 8 ounces of water. Follow with 8 oz. of water.    Admin. Amount: 17 g  Last Admin: 07/05/18 0750  Dispense Loc: [facility] 66D           0750 (17 g)-Given                  potassium chloride (KLOR-CON) Packet 20-40 mEq  Dose: 20-40 mEq  Freq: EVERY 2 HOURS PRN Route: ORAL OR FEED  PRN Reason: potassium supplementation  Start: 07/04/18 1223   Admin Instructions: Use if unable to tolerate tablets.  If Serum K+ 3.0-3.3, dose = 60 mEq po total dose (40 mEq x1 followed in 2 hours by 20 mEq x1). Recheck K+ level 4 hours after dose and the next AM.  If Serum K+ 2.5-2.9, dose = 80 mEq po total dose (40 mEq Q2H x2). Recheck K+ level 4 hours after dose and the next AM.  If Serum K+ less than 2.5, See IV order.  Dissolve packet contents in 4-8 ounces of cold water or juice.    Admin. Amount: 20-40 mEq  Dispense Loc: [facility] 66D               potassium chloride 10 mEq in 100 mL intermittent infusion with 10 mg lidocaine  Dose: 10 mEq  Freq: EVERY 1 HOUR PRN Route: IV  PRN Reason: potassium supplementation  Start: 07/04/18 1223   Admin Instructions: Infuse via PERIPHERAL LINE. Use potassium with lidocaine for pain with peripheral administration.  If Serum K+ 3.0-3.3, dose = 10 mEq/hr x4 doses (40 mEq IV total dose). Recheck K+ level 2 hours after dose and the next AM.  If Serum K+ less than 3.0, dose = 10 mEq/hr x6 doses (60 mEq IV total dose). Recheck K+ level 2 hours after dose and the next AM.    Admin. Amount: 10 mEq = 100 mL Conc: 10 mEq/100 mL  Dispense Loc: Contact Rx for dose  Infused Over: 1 Hours  Volume: 100 mL               potassium chloride 10 mEq in 100 mL sterile water intermittent infusion (premix)  Dose: 10 mEq  Freq: EVERY 1 HOUR PRN Route: IV  PRN  Reason: potassium supplementation  Start: 07/04/18 1223   Admin Instructions: Infuse via PERIPHERAL LINE or CENTRAL LINE. Use for central line replacement if patient weight less than 65 kg, if patient is on TPN with high potassium content or if unit does not stock 20 mEq bags.   If Serum K+ 3.0-3.3, dose = 10 mEq/hr x4 doses (40 mEq IV total dose). Recheck K+ level 2 hours after dose and the next AM.   If Serum K+ less than 3.0, dose = 10 mEq/hr x6 doses (60 mEq IV total dose). Recheck K+ level 2 hours after dose and the next AM.    Admin. Amount: 10 mEq = 100 mL Conc: 10 mEq/100 mL  Dispense Loc: Contact Rx for dose  Infused Over: 60 Minutes  Volume: 100 mL               potassium chloride 20 mEq in 50 mL intermittent infusion  Dose: 20 mEq  Freq: EVERY 1 HOUR PRN Route: IV  PRN Reason: potassium supplementation  Start: 07/04/18 1223   Admin Instructions: Infuse via CENTRAL LINE Only. May need EKG if less than 65 kg or on TPN - Max rate is 0.3 mEq/kg/hr for patients not on EKG monitoring.   If Serum K+ 3.0-3.3, dose = 20 mEq/hr x2 doses (40 mEq IV total dose). Recheck K+ level 2 hours after dose and the next AM.  If Serum K+ less than 3.0, dose = 20 mEq/hr x3 doses (60 mEq IV total dose). Recheck K+ level 2 hours after dose and the next AM.    Admin. Amount: 20 mEq = 50 mL Conc: 20 mEq/50 mL  Dispense Loc: Contact Rx for dose  Volume: 50 mL               potassium chloride SA (K-DUR/KLOR-CON M) CR tablet 20-40 mEq  Dose: 20-40 mEq  Freq: EVERY 2 HOURS PRN Route: PO  PRN Reason: potassium supplementation  Start: 07/04/18 1223   Admin Instructions: Use if able to take PO.   If Serum K+ 3.0-3.3, dose = 60 mEq po total dose (40 mEq x1 followed in 2 hours by 20 mEq x1). Recheck K+ level 4 hours after dose and the next AM.  If Serum K+ 2.5-2.9, dose = 80 mEq po total dose (40 mEq Q2H x2). Recheck K+ level 4 hours after dose and the next AM.  If Serum K+ less than 2.5, See IV order.  DO NOT CRUSH    Admin. Amount: 1-2  tablet (1-2 × 20 mEq tablet)  Last Admin: 18 1441  Dispense Loc: [facility]          1239 (40 mEq)-Given       1441 (20 mEq)-Given            senna-docusate (SENOKOT-S;PERICOLACE) 8.6-50 MG per tablet 1 tablet  Dose: 1 tablet  Freq: 2 TIMES DAILY PRN Route: PO  PRN Reason: constipation  Start: 18   Admin Instructions: If no bowel movement in 24 hours, increase to 2 tablets PO.  Hold for loose stools.  This is the first step of a three step constipation treatment.    Admin. Amount: 1 tablet  Dispense Loc: [facility]              Or  senna-docusate (SENOKOT-S;PERICOLACE) 8.6-50 MG per tablet 2 tablet  Dose: 2 tablet  Freq: 2 TIMES DAILY PRN Route: PO  PRN Reason: constipation  Start: 18   Admin Instructions: Hold for loose stools.  This is the first step of a three step constipation treatment.    Admin. Amount: 2 tablet  Dispense Loc: [facility]               tamsulosin (FLOMAX) capsule 0.4 mg  Dose: 0.4 mg  Freq: AT BEDTIME Route: PO  Start: 18   Admin Instructions: Administer 30 minutes after the same meal each day.  Capsules should be swallowed whole; do not crush chew or open.    Admin. Amount: 1 capsule (1 × 0.4 mg capsule)  Last Admin: 18  Dispense Loc:  [facility]         (0.4 mg)-Given        ()-Not Given         (0.4 mg)-Given               [ ] 2200          Completed Medications  Medications 18         Dose: 500 mg  Freq: ONCE Route: IV  Indications of Use: COMMUNITY ACQUIRED PNEUMONIA  Last Dose: Stopped (18)  Start: 18   End: 18   Admin Instructions: FIRST DOSE STAT.    Admin. Amount: 500 mg  Last Admin: 18 162  Dispense Loc:  Main Pharmacy  Infused Over: 1 Hours  Administrations Remainin  Volume: 250 mL   Mixture Administration Information:   Medication Type Amount   azithromycin 500 MG SOLR Medications 500 mg   sodium chloride 0.9 % SOLN  Base 250 mL           Current Line: Peripheral IV 18 Right Upper forearm       1621 (500 mg)-New Bag       1732-Stopped                Dose: 2 g  Freq: ONCE Route: IV  Indications of Use: COMMUNITY ACQUIRED PNEUMONIA  Last Dose: Stopped (18)  Start: 18   End: 18   Admin Instructions: FIRST DOSE STAT.    Admin. Amount: 2 g  Last Admin: 18 153  Dispense Loc: [facility]  Infused Over: 30 Minutes  Administrations Remainin  Volume: 20 mL   Current Line: Peripheral IV 18 Right Upper forearm       1536 (2 g)-New Bag       161-Stopped                Dose: 40 mg  Freq: ONCE Route: IV  Start: 18 09   End: 18   Admin Instructions: For ordered doses up to 40 mg, give IV Push undiluted over 1-2 minutes.    Admin. Amount: 40 mg = 4 mL Conc: 10 mg/mL  Last Admin: 18  Dispense Loc:  [facility] 66D  Infused Over: 1-2 Minutes  Administrations Remainin  Volume: 4 mL   Current Line: Peripheral IV 18 Right Upper forearm          0918 (40 mg)-Given             Dose: 91 mL  Freq: ONCE Route: IV  Start: 18 131   End: 18 134   Admin. Amount: 91 mL  Last Admin: 18 134  Dispense Loc:  [facility] FLOOR STOCK  Administrations Remainin  Volume: 91 mL   Current Line: Peripheral IV 18 Right Upper forearm       1342 (91 mL)-Given                Dose: 1,000 mL  Freq: ONCE Route: IV  Last Dose: Stopped (18 141)  Start: 18 121   End: 18 141   Admin. Amount: 1,000 mL  Last Admin: 18 1251  Dispense Loc: [facility] Floor Stock  Infused Over: 1 Hours  Administrations Remainin  Volume: 1,000 mL   Current Line: Peripheral IV 18 Right Upper forearm       1251 (1,000 mL)-New Bag       1419-Stopped             Followed by    Dose: 1,000 mL  Freq: ONCE Route: IV  Last Dose: Stopped (18 1534)  Start: 18 1216   End: 18 1534   Admin. Amount: 1,000 mL  Last Admin: 18 1420  Dispense Loc: [facility]  Floor Stock  Infused Over: 1 Hours  Administrations Remainin  Volume: 1,000 mL   Current Line: Peripheral IV 18 Right Upper forearm       1420 (1,000 mL)-New Bag       1534-Stopped                Dose: 68 mL  Freq: ONCE Route: IV  Start: 18 1316   End: 18 1343   Admin. Amount: 68 mL  Last Admin: 18  Dispense Loc: [facility] FLOOR STOCK  Administrations Remainin  Volume: 500 mL   Current Line: Peripheral IV 18 Right Upper forearm       1343 (68 mL)-Given             Discontinued Medications  Medications 18         Dose: 650 mg  Freq: EVERY 8 HOURS PRN Route: RE  PRN Reason: mild pain  Start: 18   End: 18   Admin Instructions: Alternate ibuprofen (if ordered) with acetaminophen.  Maximum acetaminophen dose from all sources = 75 mg/kg/day not to exceed 4 grams/day.    Admin. Amount: 1 suppository (1 × 650 mg suppository)  Dispense Loc:  [facility] OBSV        7-Med Discontinued            Dose: 650 mg  Freq: EVERY 4 HOURS PRN Route: RE  PRN Reason: mild pain  Start: 18   End: 18   Admin Instructions: Alternate ibuprofen (if ordered) with acetaminophen.  Maximum acetaminophen dose from all sources = 75 mg/kg/day not to exceed 4 grams/day.    Admin. Amount: 1 suppository (1 × 650 mg suppository)  Dispense Loc:  [facility] OBSV        3-Med Discontinued            Dose: 650 mg  Freq: EVERY 8 HOURS PRN Route: PO  PRN Reason: mild pain  Start: 18   End: 18   Admin Instructions: Alternate ibuprofen (if ordered) with acetaminophen.  Maximum acetaminophen dose from all sources = 75 mg/kg/day not to exceed 4 grams/day.    Admin. Amount: 2 tablet (2 × 325 mg tablet)        -Med Discontinued            Dose: 650 mg  Freq: EVERY 4 HOURS PRN Route: PO  PRN Reason: mild pain  Start: 18   End: 18   Admin Instructions: Alternate ibuprofen (if ordered)  with acetaminophen.  Maximum acetaminophen dose from all sources = 75 mg/kg/day not to exceed 4 grams/day.    Admin. Amount: 2 tablet (2 × 325 mg tablet)  Dispense Loc: [facility]        1943-Med Discontinued            Dose: 650 mg  Freq: EVERY 4 HOURS PRN Route: PO  PRN Reason: mild pain  Start: 18   End: 18   Admin Instructions: Maximum acetaminophen dose from all sources = 75 mg/kg/day not to exceed 4 grams/day.    Admin. Amount: 2 tablet (2 × 325 mg tablet)  Dispense Loc: [facility] OBS[facility]        192-Med Discontinued         Or    Dose: 650 mg  Freq: EVERY 4 HOURS PRN Route: RE  PRN Reason: mild pain  Start: 18   End: 18   Admin Instructions: Maximum acetaminophen dose from all sources = 75 mg/kg/day not to exceed 4 grams/day.    Admin. Amount: 1 suppository (1 × 650 mg suppository)  Dispense Loc: [facility]        -Med Discontinued            Dose: 650 mg  Freq: ONCE Route: PO  Start: 18 1216   End: 18   Admin Instructions: Maximum acetaminophen dose from all sources = 75 mg/kg/day not to exceed 4 grams/day.    Admin. Amount: 2 tablet (2 × 325 mg tablet)  Dispense Loc: [facility] OBSV  Administrations Remainin        (1300)-Not Given       -Med Discontinued            Dose: 250 mg  Freq: EVERY EVENING Route: PO  Indications of Use: COMMUNITY ACQUIRED PNEUMONIA  Start: 18 1800   End: 18 09   Admin. Amount: 1 tablet (1 × 250 mg tablet)  Last Admin: 18 1734  Dispense Loc: [facility]  Administrations Remaining: 3         1734 (250 mg)-Given        913-Med Discontinued          Dose: 600 mg  Freq: EVERY 6 HOURS PRN Route: PO  PRN Reasons: moderate pain,fever  Start: 18   End: 18 1143   Admin Instructions: Take with food    Admin. Amount: 1 tablet (1 × 600 mg tablet)  Last Admin: 18 0007  Dispense Loc: [facility]        1958 (600 mg)-Given        1055 (600 mg)-Given       1736 (600 mg)-Given        0007 (600  mg)-Given       1143-Med Discontinued          Rate: 125 mL/hr   Freq: CONTINUOUS Route: IV  Start: 18   End: 18   Admin Instructions: Give 1 more liter at 150 cc/hr then decrease to 125 cc/hr    Last Admin: 18  Dispense Loc: [facility] Floor Stock  Volume: 1,000 mL   Current Line: Peripheral IV 18 Right Upper forearm       2001 ( )-New Bag        0227 ( )-New Bag [C]       1000 ( )-Rate/Dose Verify       1007 ( )-New Bag       1734 ( )-New Bag       [ ] 1759 (Rate/Dose Change)       1759-Med Discontinued           Rate: 125 mL/hr Dose: 1000 mL  Freq: CONTINUOUS Route: IV  Last Dose: Stopped (18)  Start: 18 121   End: 18   Admin Instructions: Administer after the bolus.    Admin. Amount: 1,000 mL  Last Admin: 18  Dispense Loc: [facility] Floor Stock  Volume: 1,000 mL   Current Line: Peripheral IV 18 Right Upper forearm       1836 (1,000 mL)-New Bag       1842-ED Infusing on Admission/transfer       -Med Discontinued  -Stopped                Dose: 4 mg  Freq: EVERY 30 MIN PRN Route: IV  PRN Reasons: nausea,vomiting  Start: 18 1228   End: 18   Admin Instructions: May repeat in 30 minutes as needed, up to 3 doses.  Irritant. For ordered doses up to 4 mg, give IV Push undiluted over 2-5 minutes.    Admin. Amount: 4 mg = 2 mL Conc: 4 mg/2 mL  Last Admin: 18 161  Dispense Loc: [facility] OBSV  Infused Over: 2-5 Minutes  Administrations Remainin  Volume: 2 mL   Current Line: Peripheral IV 18 Right Upper forearm       1250 (4 mg)-Given       1616 (4 mg)-Given       1925-Med Discontinued            Dose: 8.6 mg  Freq: 2 TIMES DAILY PRN Route: PO  PRN Reason: constipation  Start: 1833   End: 18 0735   Admin. Amount: 1 tablet (1 × 8.6 mg tablet)  Last Admin: 18  Dispense Loc:  [facility]         1055 (8.6 mg)-Given       1734 (8.6 mg)-Given         (8.6 mg)-Given        0735-Med Discontinued          Rate: 125 mL/hr   Freq: CONTINUOUS Route: IV  Last Dose: Stopped (07/04/18 1058)  Start: 07/03/18 1800   End: 07/04/18 1056   Last Admin: 07/04/18 0938  Dispense Loc: [facility] Floor Stock  Volume: 1,000 mL   Current Line: Peripheral IV 07/02/18 Right Upper forearm        1806 ( )-New Bag       2102 ( )-Rate/Dose Verify        0001 ( )-Rate/Dose Verify       0141 ( )-New Bag       0938 ( )-New Bag       1056-Med Discontinued  1058-Stopped         Medications 06/29/18 06/30/18 07/01/18 07/02/18 07/03/18 07/04/18 07/05/18               INTERAGENCY TRANSFER FORM - NOTES (H&P, Discharge Summary, Consults, Procedures, Therapies)   7/2/2018                       [place] [address] [facility]: [phone]               History & Physicals      H&P by Chris Ortiz DO at 7/2/2018  7:25 PM     Author:  Chris Ortiz DO Service:  Hospitalist Author Type:  Physician    Filed:  7/2/2018  7:51 PM Date of Service:  7/2/2018  7:25 PM Creation Time:  7/2/2018  7:25 PM    Status:  Signed :  Chris Ortiz DO (Physician)         [facility]    Hospitalist History and Physical    Name: Elmer Carter    MRN: [id number]  YOB: 1955    Age: 63 year old  Date of Admission:  7/2/2018    Assessment & Plan   Elmer Carter is a 63 year old male[SS1.1] visiting from [place] with his family[SS1.2] who presented to[SS1.1] the [facility][SS1.2]  with[SS1.1] fevers and chills.  He was diagnosed in the ER with a pneumonia[SS1.2].[SS1.1]    1.  Community acquired pneumonia:  -  No hypoxia but fairly high fevers.  Plan OBS status initially with some IVF and tylenol/ibuprofen and ceftriaxone + zithromax.  If he worsens and shows signs of sepsis or worsening hypoxia I would admit him to inpt status.  If he quickly improves, he could go home tomorrow afternoon or the next day as he hopes to d/c quickly.  -  Empiric nebs initially for reactive airway component[SS1.2]  -  Mild pancytopenia likely  infection response.  I will recheck a CBC in the AM.[SS1.3]    2.  BPH:  -  Flomax    3.  Hypothyroidism:  -  Hold levothyroxine initially here, can resume at discharge from OBS[SS1.2]    4.  Elevated LFT's:  -  Mildly elevated, unclear etiology.  His ABD exam is benign and his Chest imaging suggests a pneumonia.  Minimize further tylenol tonight.  He was not using it excessively by report though.  He reports only 4 gm over the past 2 days total.  Recheck LFT's in the AM.  I will try a regular diet, further eval if numbers worsen/he cannot tolerate diet.[SS1.3]    DVT Prophylaxis:[SS1.1] Pneumatic Compression Devices[SS1.2]  Code Status:[SS1.1] Full Code[SS1.2]    Disposition: Expected discharge in[SS1.1] 1-2[SS1.2] days once[SS1.1] fevers/chills improved[SS1.2].    Primary Care Physician   Physician No Ref-Primary       Chief Complaint[SS1.1]   Cough, fevers, chills    History is obtained from the patient[SS1.2].  I also spoke with the ER provider about the history.     History of Present Illness   Elmer Carter is a 63 year old male[SS1.1] visiting from [place] with his family[SS1.2] who presented to[SS1.1] the [facility][SS1.2]  with[SS1.1] fevers and chills.  He was diagnosed in the ER with a pneumonia[SS1.2].[SS1.1]  He flew in with his family Friday and started having chills.  These worsened into sweats and rigors over the weekend.  He also had a worsening non-productive cough.  No chest pain, emesis.  He has had a decreased appetite though.  No other acute complaints.  No other known sick contacts.  Denies asthma/COPD, other recent infections or major medical problems.  He is planning to go back to [place] in a few days.[SS1.2]     Past Medical History[SS1.1]    Hypothyroidism  BPH  Reflux[SS1.2]    Past Surgical History   Past Surgical History:   Procedure Laterality Date     CHOLECYSTECTOMY       ORTHOPEDIC SURGERY         Prior to Admission Medications   Prior to Admission Medications   Prescriptions Last Dose  Informant Patient Reported? Taking?   Celecoxib (CELEBREX PO) Past Month at Unknown time  Yes Yes   Sig: Take 100 mg by mouth 2 times daily as needed for moderate pain   IBUPROFEN PO Past Week at Unknown time  Yes Yes   Sig: Take 400 mg by mouth every 6 hours as needed for moderate pain   Levothyroxine Sodium (SYNTHROID PO) 7/2/2018 at am  Yes Yes   Sig: Take 75 mcg by mouth daily    OMEPRAZOLE PO Past Week at Unknown time  Yes Yes   Sig: Take 20 mg by mouth daily as needed (Takes when taking ibuprofen)   TURMERIC CURCUMIN PO 7/1/2018 at Unknown time  Yes Yes   Sig: Take by mouth daily   VITAMIN D, CHOLECALCIFEROL, PO 7/1/2018 at Unknown time  Yes Yes   Sig: Take 1,000 Units by mouth daily   tamsulosin (FLOMAX) 0.4 MG capsule 7/1/2018 at hs  Yes Yes   Sig: Take 0.4 mg by mouth At Bedtime       Facility-Administered Medications: None     Allergies   No Known Allergies    Social History   Social History   Substance Use Topics     Smoking status: Never Smoker     Smokeless tobacco: Never Used     Alcohol use No[SS1.1]   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  Pertinent positives and negatives are noted above.  Otherwise negative for contributory information.    Physical Exam   Temp: 103.1  F (39.5  C) Temp src: Oral BP: 112/47 Pulse: 89 Heart Rate: 92 Resp: 18 SpO2: 90 % O2 Device: None (Room air)    Vital Signs with Ranges  Temp:  [101.9  F (38.8  C)-103.1  F (39.5  C)] 103.1  F (39.5  C)  Pulse:  [89] 89  Heart Rate:  [87-92] 92  Resp:  [11-22] 18  BP: (112-129)/(47-75) 112/47  SpO2:  [89 %-97 %] 90 %  181 lbs 0 oz    GEN:  Alert, oriented x 3, appears[SS1.1] ill but[SS1.2] comfortable, no overt distress[SS1.1].[SS1.2]  HEENT:  Normocephalic/atraumatic, no scleral icterus, no nasal discharge, mouth moist.  CV:  Regular rate and rhythm, no murmur  or JVD.  S1 + S2 noted, no S3 or S4.  LUNGS:[SS1.1]  Left base to mid lung crackles, mildly decreased base sounds.  No wheezes/retractions[SS1.2].  Symmetric chest rise on inhalation noted.  ABD:  Active bowel sounds, soft, non-tender/non-distended.  No rebound/guarding/rigidity.  EXT:[SS1.1] No[SS1.2] edema.  No cyanosis.  No[SS1.1] acute[SS1.2] joint synovitis noted.  SKIN:  Dry to touch, no exanthems noted in the visualized areas.  NEURO:[SS1.1]  Moves extremities well on general exam[SS1.2], sensation to touch grossly intact.  No new focal deficits appreciated.    Data   Data reviewed today:  I personally reviewed[SS1.1] the chest x-ray image(s) showing left infiltrate[SS1.2].[SS1.1]      Recent Labs  Lab 07/02/18  1200   WBC 3.0*   HGB 11.7*   HCT 35.1*   MCV 90   *       Recent Labs  Lab 07/02/18  1251 07/02/18  1205   CULT No growth after 3 hours No growth after 3 hours       Recent Labs  Lab 07/02/18  1200      POTASSIUM 4.2   CHLORIDE 98   CO2 25   ANIONGAP 10   *   BUN 15   CR 0.91   GFRESTIMATED 84   GFRESTBLACK >90   NOELLE 8.2*   PROTTOTAL 7.3   ALBUMIN 2.9*   BILITOTAL 2.3*   ALKPHOS 210*   *   *[SS1.4]       Recent Results (from the past 24 hour(s))   XR Chest 2 Views    Narrative    XR CHEST 2 VW 7/2/2018 1:47 PM    HISTORY: Cough and fever.    COMPARISON: None.    FINDINGS: Left basilar opacity. Right lung is clear. No pleural  effusion or pneumothorax. Normal heart size.      Impression    IMPRESSION: Left basilar pneumonia.    ANA SILVER MD   CT Chest/Abdomen/Pelvis w Contrast    Narrative    CT CHEST/ABDOMEN/PELVIS WITH CONTRAST 7/2/2018 1:50 PM     HISTORY:  Fever, cough, nausea and vomiting, abdominal tenderness;  oral water prep.      TECHNIQUE: Axial images from the thoracic inlet to the symphysis are  performed with additional coronal reformatted images. 91 mL of Isovue  370 are given intravenously.  Radiation dose for this scan was reduced  using automated  exposure control, adjustment of the mA and/or kV  according to patient size, or iterative reconstruction technique.    FINDINGS:     Chest: Subtle infiltrate is noted at the lateral left lung base  concerning for pneumonia. Atelectasis is also noted at the posterior  costophrenic angles bilaterally. No pleural or pericardial fluid. The  heart is normal in size. The esophagus is unremarkable. Thyroid gland  is prominent in size with a few tiny subcentimeter cysts of doubtful  clinical significance. No enlarged lymph nodes in the chest or  axillas. Thoracic and abdominal aorta are unremarkable. No evidence of  aneurysm or dissection. Central pulmonary arteries are patent.    Abdomen: Prior cholecystectomy changes. The liver, spleen, pancreas,  adrenal glands and kidneys are unremarkable. No hydronephrosis. Trace  amount of ascites is noted in the region of the right paracolic  gutter. The bowel is normal in caliber without obstruction,  diverticulitis or appendicitis. No enlarged abdominal lymph nodes.    Pelvis: Prostate gland is mildly enlarged. The bladder and rectum are  unremarkable. No enlarged pelvic or inguinal lymph nodes. Trace amount  of free pelvic fluid is noted.      Impression    IMPRESSION:  1. Bibasilar atelectasis with probable additional infiltrate left lung  base concerning for pneumonia. Lungs are otherwise clear.  2. Trace amount of fluid right paracolic gutter and pelvis without  obvious etiology. Abdominal and pelvic organs are within normal  limits. No bowel obstruction, diverticulitis or appendicitis.  3. Prior cholecystectomy changes.    CASSIA SU MD[SS1.1]          Revision History        User Key Date/Time User Provider Type Action    > SS1.3 7/2/2018  7:51 PM Chris Ortiz DO Physician Sign     SS1.4 7/2/2018  7:43 PM Chris Ortiz DO Physician      SS1.2 7/2/2018  7:36 PM Chris Ortiz DO Physician      SS1.1 7/2/2018  7:25 PM Chris Ortiz DO Physician                    Discharge Summaries     No notes of this type exist for this encounter.      Consult Notes     No notes of this type exist for this encounter.         Progress Notes - Physician (Notes for yesterday and today)      Progress Notes by Ramon Hackett DO at 7/4/2018  9:11 AM     Author:  Ramon Hackett DO Service:  Hospitalist Author Type:  Physician    Filed:  7/4/2018  2:04 PM Date of Service:  7/4/2018  9:11 AM Creation Time:  7/4/2018  9:11 AM    Status:  Addendum :  Ramon Hackett DO (Physician)         [facility]  Hospitalist Progress Note        Ramon Hackett DO  07/04/2018        Interval History:[ZA1.1]      Patient states that he is feeling better.[ZA1.2]          Assessment and Plan:        Elmer Carter is a 63 year old male visiting from [place] with a past medical history significant for BPH, remote PE (provoked after surgery), and hypothyroidism who was admitted on 7/2/2018 with pneumonia after presenting with fevers, chills, and shortness of breath.      Community acquired pneumonia. CXR and chest CR show LLL infiltrate. Patient presented with fevers and chills with tmax of 103. He has remained afebrile today. O2 sat stable on room air at rest. He continues to feel short of breath today. Also noted occasional cough, headache, and poor appetite. Was able to eat a little today. Procalcitonin is elevated at 1.29 suggesting moderate risk of systemic infection. Some viral features as well including rash and headache. WBC low at 2.9. BP soft but stable. BC remain negative.[ZA1.1]   -[ZA1.2] Ceftriaxone  -[ZA1.1] N[ZA1.2]ebs prn     Mild pancytopenia. Stable from admission. Likely reactive in setting of infection. neutrophils WNL.   -[ZA1.1] Monitor.   - Peripheral smear  - Reticulocyte index[ZA1.2]     Elevated LFTs. Trending slightly downward today though t. Bili remains elevated at 2.3 with direct bili of 1.6. Etiology is unclear. Abdominal exam is  "benign. CT abdomen shows trace fluid in pelvis and paracolic gutter. Tolerating regular diet. No jaundice. Denies significant PTA tylenol use. Denies alcohol use.   - US abdome[ZA1.1]n completed.[ZA1.2]   - LFTs  - hepatitis screen[ZA1.1] pending.[ZA1.2]   - INR[ZA1.1] normal.[ZA1.2]      Diffuse macular rash. Does not look like drug rash or urticaria. No itching, pain. No associated throat tightness or increased SOB. Started this afternoon without clear precipitating factor.   -[ZA1.1] Discontinued Azithromycin.[ZA1.2]      Hypothyroidism. Resume levothyroxine     Remote hx of DVT. Occurred after knee surgery in . No recurrence.      DVT Prophylaxis: Pneumatic Compression Devices  Code Status: Full Code     Disposition: Expected discharge in 1-2 days pending clinical improvement     Pain: # Pain Assessment:  Current Pain Score 2018   Patient currently in pain? sleeping: patient not able to self report   Pain score (0-10) -   Pain location -   Pain descriptors -[ZA1.1]   Elmer s pain level was assessed and he currently denies pain.[ZA1.2]                     Physical Exam:      Heart Rate: 95    Blood pressure 112/66, pulse 89, temperature 98.1  F (36.7  C), temperature source Oral, resp. rate 16, height 1.918 m (6' 3.5\"), weight 82.1 kg (181 lb), SpO2 94 %.    Vitals:    18 1132   Weight: 82.1 kg (181 lb)       Vital Sign Ranges  Temperature Temp  Av.6  F (37  C)  Min: 98.1  F (36.7  C)  Max: 99.1  F (37.3  C)   Blood pressure Systolic (24hrs), Av , Min:99 , Max:112        Diastolic (24hrs), Av, Min:54, Max:66      Pulse No Data Recorded   Respirations Resp  Av.4  Min: 16  Max: 18   Pulse oximetry SpO2  Av.4 %  Min: 89 %  Max: 96 %     Vital Signs with Ranges  Temp:  [98.1  F (36.7  C)-99.1  F (37.3  C)] 98.1  F (36.7  C)  Heart Rate:  [77-95] 95  Resp:  [16-18] 16  BP: ()/(54-66) 112/66  SpO2:  [89 %-96 %] 94 %    I/O Last 3 Shifts:   I/O last 3 completed shifts:  In: " 4745.83 [P.O.:820; I.V.:3925.83]  Out: -     I/O past 24 hours:     Intake/Output Summary (Last 24 hours) at 07/04/18 0911  Last data filed at 07/04/18 0600   Gross per 24 hour   Intake          4745.83 ml   Output                0 ml   Net          4745.83 ml     GENERAL: Alert and oriented. NAD. Conversational, appropriate.   HEENT: Normocephalic. EOMI. No icterus or injection. Nares normal.   LUNGS: Clear to auscultation. No dyspnea at rest.   HEART: Regular rate. Extremities perfused. Macular rash present on chest[ZA1.1] improving.[ZA1.2]   ABDOMEN: Soft, nontender, and nondistended. Positive bowel sounds.   EXTREMITIES: No LE edema noted.   NEUROLOGIC: Moves extremities x4 on command. No acute focal neurologic abnormalities noted.[ZA1.1] No meningeal signs.[ZA1.3]          Prior to Admission Medications:        Prescriptions Prior to Admission   Medication Sig Dispense Refill Last Dose     Celecoxib (CELEBREX PO) Take 100 mg by mouth 2 times daily as needed for moderate pain   Past Month at Unknown time     IBUPROFEN PO Take 400 mg by mouth every 6 hours as needed for moderate pain   Past Week at Unknown time     Levothyroxine Sodium (SYNTHROID PO) Take 75 mcg by mouth daily    7/2/2018 at am     OMEPRAZOLE PO Take 20 mg by mouth daily as needed (Takes when taking ibuprofen)   Past Week at Unknown time     tamsulosin (FLOMAX) 0.4 MG capsule Take 0.4 mg by mouth At Bedtime    7/1/2018 at hs     TURMERIC CURCUMIN PO Take by mouth daily   7/1/2018 at Unknown time     VITAMIN D, CHOLECALCIFEROL, PO Take 1,000 Units by mouth daily   7/1/2018 at Unknown time            Medications:        Current Facility-Administered Medications   Medication Last Rate     sodium chloride 125 mL/hr at 07/04/18 0141     Current Facility-Administered Medications   Medication Dose Route Frequency     albuterol  2.5 mg Nebulization 4x Daily     azithromycin  250 mg Oral QPM     cefTRIAXone  2 g Intravenous Q24H     tamsulosin  0.4 mg  Oral At Bedtime     Current Facility-Administered Medications   Medication Dose Route Frequency     albuterol  2.5 mg Nebulization Q2H PRN     ibuprofen (ADVIL/MOTRIN) tablet 600 mg  600 mg Oral Q6H PRN     melatonin  1 mg Oral At Bedtime PRN     naloxone  0.1-0.4 mg Intravenous Q2 Min PRN     ondansetron  4 mg Oral Q6H PRN    Or     ondansetron  4 mg Intravenous Q6H PRN     sennosides  8.6 mg Oral BID PRN            Data:      Lab data reviewed.     Recent Labs  Lab 07/03/18  0625 07/02/18  1200   HGB 11.4* 11.7*   MCV 91 90   PLT 87* 104*    133   POTASSIUM 3.6 4.2   CHLORIDE 105 98   CO2 27 25   BUN 13 15   CR 0.97 0.91   ANIONGAP 9 10   NOELLE 8.0* 8.2*   * 100*           Imaging:      Imaging data reviewed.     Dr. Ramon Hackett D.O.  [facility]  Pager [phone][ZA1.1]       Revision History        User Key Date/Time User Provider Type Action    > ZA1.3 7/4/2018  2:04 PM Ramon Hackett, DO Physician Addend     ZA1.2 7/4/2018 11:54 AM Ramon Hackett, DO Physician Sign     ZA1.1 7/4/2018  9:11 AM Ramon Hackett, DO Physician                   Procedure Notes     No notes of this type exist for this encounter.         Progress Notes - Therapies (Notes from 07/02/18 through 07/05/18)      Progress Notes by Mayra Jean RT at 7/4/2018  3:08 PM     Author:  Mayra Jean RT Service:  Respiratory Therapy Author Type:  Respiratory Therapist    Filed:  7/4/2018  3:09 PM Date of Service:  7/4/2018  3:08 PM Creation Time:  7/4/2018  3:08 PM    Status:  Signed :  Mayra Jean RT (Respiratory Therapist)         Patient currently on room air. Lung sounds clear. SpO2 in the mid 90's.  Nebulizer tx given as ordered. Will continue to follow.[KV1.1]    Mayra Jean[KV1.2] RT[KV1.1]     Revision History        User Key Date/Time User Provider Type Action    > KV1.2 7/4/2018  3:09 PM Jean, Ka, RT Respiratory Therapist Sign     KV1.1 7/4/2018  3:08 PM Mayra Jean, RT Respiratory  "Therapist                                                       INTERAGENCY TRANSFER FORM - LAB / IMAGING / EKG / EMG RESULTS   7/2/2018                       [place] [address] [facility]: [phone]            Unresulted Labs (24h ago through future)    Start       Ordered    Unscheduled  Potassium  (Potassium Replacement - \"Standard\" - For K levels less than 3.4 mmol/L - UU,UR,UA,RH,SH,PH,WY )  CONDITIONAL (SPECIFY),   Routine     Comments:  Obtain Potassium Level for these conditions:  *IF no potassium result within 24 hours before initiation of order set, draw potassium level with next lab collect.    *2 HOURS AFTER last IV potassium replacement dose and 4 hours after an oral replacement dose.  *Next morning after potassium dose.     Repeat Potassium Replacement if necessary.    07/04/18 1223    Unscheduled  Magnesium  (Magnesium Replacement -  Adult - \"Standard\" - Replacement for all levels less than 1.6 mg/dL )  CONDITIONAL (SPECIFY),   Routine     Comments:  Obtain Magnesium Level for these conditions:  *IF no magnesium result within 24 hrs before initiation of order set, draw magnesium level with next lab collect.    *2 HOURS AFTER last magnesium replacement dose when magnesium replacement given for level less than 1.6   *Next morning after magnesium dose.     Repeat Magnesium Replacement if necessary.    07/04/18 1223         Lab Results - 3 Days      Procalcitonin [[id number]]  Resulted: 07/05/18 0900, Result status: Final result    Ordering provider: Ramon Hackett DO  07/05/18 0000 Resulting lab: [facility]    Specimen Information    Type Source Collected On   Blood  07/05/18 0750          Components       Value Reference Range Flag Lab   Procalcitonin 0.69 ng/ml  [facility]   Comment:         0.50-1.99 ng/ml  Moderate risk of systemic infection.  Recommendation:   Recommend antibiotics. Evaluate culture results and clinical features to   target antibacterial " therapy. Obtain blood cultures and other relevant   cultures if not done.  If empiric antibiotics were started, recheck PCT in: 2 days to guide   antibiotic de-escalation.  Discontinue or de-escalate antibiotics when PCT   concentration is <80% of peak or abs PCT <0.5. If empiric antibiotics were NOT   started, recheck PCT in 6-24 hours to re-evaluate need for antibiotics.              Magnesium [[id number]]  Resulted: 07/05/18 0857, Result status: Final result    Ordering provider: Ramon Hackett,   07/05/18 0750 Resulting lab: [facility]    Specimen Information    Type Source Collected On     07/05/18 0750          Components       Value Reference Range Flag Lab   Magnesium 1.7 1.6 - 2.3 mg/dL  FrStHsLb            NT proBNP inpatient and ED [[id number]] (Abnormal)  Resulted: 07/05/18 0844, Result status: Final result    Ordering provider: Ramon Hackett,   07/05/18 0000 Resulting lab: [facility]    Specimen Information    Type Source Collected On   Blood  07/05/18 0750          Components       Value Reference Range Flag Lab   N-Terminal Pro BNP Inpatient 1925 0 - 900 pg/mL H FrStHsLb   Comment:            Reference range shown and results flagged as abnormal are suggested inpatient   cut points for confirming diagnosis if CHF in an acute setting. Establishing a   baseline value for each individual patient is useful for follow-up. An   inpatient or emergency department NT-proPBNP <300 pg/mL effectively rules out   acute CHF, with 99% negative predictive value.  The outpatient non-acute reference range for ruling out CHF is:   0-125 pg/mL (age 18 to less than 75)   0-450 pg/mL (age 75 yrs and older)              Comprehensive metabolic panel [[id number]] (Abnormal)  Resulted: 07/05/18 0843, Result status: Final result    Ordering provider: Ramon Hackett DO  07/05/18 0000 Resulting lab: [facility]    Specimen Information    Type Source  Collected On   Blood  07/05/18 0750          Components       Value Reference Range Flag Lab   Sodium 137 133 - 144 mmol/L  FrStHsLb   Potassium 4.1 3.4 - 5.3 mmol/L  FrStHsLb   Chloride 103 94 - 109 mmol/L  FrStHsLb   Carbon Dioxide 29 20 - 32 mmol/L  FrStHsLb   Anion Gap 5 3 - 14 mmol/L  FrStHsLb   Glucose 96 70 - 99 mg/dL  FrStHsLb   Urea Nitrogen 8 7 - 30 mg/dL  FrStHsLb   Creatinine 0.95 0.66 - 1.25 mg/dL  FrStHsLb   GFR Estimate 80 >60 mL/min/1.7m2  FrStHsLb   Comment:  Non  GFR Calc   GFR Estimate If Black >90 >60 mL/min/1.7m2  FrStHsLb   Comment:  African American GFR Calc   Calcium 7.8 8.5 - 10.1 mg/dL L FrStHsLb   Bilirubin Total 1.9 0.2 - 1.3 mg/dL H FrStHsLb   Albumin 2.0 3.4 - 5.0 g/dL L FrStHsLb   Protein Total 5.8 6.8 - 8.8 g/dL L FrStHsLb   Alkaline Phosphatase 221 40 - 150 U/L H FrStHsLb   ALT 92 0 - 70 U/L H FrStHsLb   AST 39 0 - 45 U/L  FrStHsLb            Blood Morphology Pathologist Review [[id number]]  Resulted: 07/05/18 0832, Result status: In process    Ordering provider: Ramon Hackett DO  07/04/18 1152 Resulting lab: [facility]    Specimen Information    Type Source Collected On   Blood  07/04/18 0852            CBC with platelets [[id number]] (Abnormal)  Resulted: 07/05/18 0825, Result status: Final result    Ordering provider: Ramon Hackett DO  07/05/18 0000 Resulting lab: [facility]    Specimen Information    Type Source Collected On   Blood  07/05/18 0750          Components       Value Reference Range Flag Lab   WBC 5.0 4.0 - 11.0 10e9/L  FrStHsLb   RBC Count 3.53 4.4 - 5.9 10e12/L L FrStHsLb   Hemoglobin 10.4 13.3 - 17.7 g/dL L FrStHsLb   Hematocrit 31.9 40.0 - 53.0 % L FrStHsLb   MCV 90 78 - 100 fl  FrStHsLb   MCH 29.5 26.5 - 33.0 pg  FrStHsLb   MCHC 32.6 31.5 - 36.5 g/dL  FrStHsLb   RDW 16.6 10.0 - 15.0 % H FrStHsLb   Platelet Count 127 150 - 450 10e9/L L FrStHsLb            Blood culture [[id number]]  Resulted: 07/05/18 0729, Result  status: Preliminary result    Ordering provider: Ramon Hackett DO  07/04/18 1943 Resulting lab: [facility]    Specimen Information    Type Source Collected On   Blood  07/04/18 2017   Comment:  Right Hand          Components       Value Reference Range Flag Lab   Specimen Description Blood Right Hand      Special Requests Aerobic and anaerobic bottles received   FrStHsLb   Culture Micro No growth after 8 hours   225            Blood culture [[id number]]  Resulted: 07/05/18 0729, Result status: Preliminary result    Ordering provider: Ramon Hackett DO  07/04/18 1943 Resulting lab: [facility]    Specimen Information    Type Source Collected On   Blood  07/04/18 2010   Comment:  Left Arm          Components       Value Reference Range Flag Lab   Specimen Description Blood Left Arm      Special Requests Aerobic and anaerobic bottles received   FrStHsLb   Culture Micro No growth after 8 hours   225            Blood culture [[id number]]  Resulted: 07/05/18 0219, Result status: Preliminary result    Ordering provider: Jany Willard MD  07/02/18 1215 Resulting lab: [facility]    Specimen Information    Type Source Collected On   Blood Arm, Right 07/02/18 1251   Comment:  Right Arm          Components       Value Reference Range Flag Lab   Specimen Description Blood Right Arm      Special Requests Aerobic and anaerobic bottles received   FrStHsLb   Culture Micro No growth after 3 days   75            Blood culture [[id number]]  Resulted: 07/05/18 0219, Result status: Preliminary result    Ordering provider: Jany Willard MD  07/02/18 1215 Resulting lab: [facility]    Specimen Information    Type Source Collected On   Blood Arm, Right 07/02/18 1205   Comment:  Right Arm          Components       Value Reference Range Flag Lab   Specimen Description Blood Right Arm      Special Requests Aerobic  and anaerobic bottles received   FrStHsLb   Culture Micro No growth after 3 days   75            Potassium [[id number]]  Resulted: 07/04/18 1927, Result status: Final result    Ordering provider: Ramon Hackett DO  07/04/18 1445 Resulting lab: [facility]    Specimen Information    Type Source Collected On   Blood  07/04/18 1900          Components       Value Reference Range Flag Lab   Potassium 3.9 3.4 - 5.3 mmol/L  FrStHsLb            Magnesium [[id number]]  Resulted: 07/04/18 1556, Result status: Final result    Ordering provider: Jane Platt PA-C  07/04/18 0852 Resulting lab: [facility]    Specimen Information    Type Source Collected On     07/04/18 0852          Components       Value Reference Range Flag Lab   Magnesium 1.8 1.6 - 2.3 mg/dL  FrStHsLb            Respiratory Virus Panel by PCR [[id number]]  Resulted: 07/04/18 1548, Result status: In process    Ordering provider: Ramon Hackett DO  07/04/18 1218 Resulting lab: [facility]    Specimen Information    Type Source Collected On   Nasopharyngeal  07/04/18 1545            Reticulocyte count [[id number]] (Abnormal)  Resulted: 07/04/18 1214, Result status: Final result    Ordering provider: Jane Platt PA-C  07/04/18 0852 Resulting lab: [facility]    Specimen Information    Type Source Collected On     07/04/18 0852          Components       Value Reference Range Flag Lab   % Retic 0.6 0.5 - 2.0 %  FrStHsLb   Absolute Retic 21.6 25 - 95 10e9/L L FrStHsLb            Basic metabolic panel [[id number]] (Abnormal)  Resulted: 07/04/18 0933, Result status: Final result    Ordering provider: Ramon Hackett DO  07/04/18 0001 Resulting lab: [facility]    Specimen Information    Type Source Collected On   Blood  07/04/18 0852          Components       Value Reference Range Flag Lab   Sodium 143 133 - 144 mmol/L  FrStHsLb   Potassium 3.3 3.4 - 5.3 mmol/L L FrStHsLb    Chloride 108 94 - 109 mmol/L  FrStHsLb   Carbon Dioxide 30 20 - 32 mmol/L  FrStHsLb   Anion Gap 5 3 - 14 mmol/L  FrStHsLb   Glucose 104 70 - 99 mg/dL H FrStHsLb   Urea Nitrogen 11 7 - 30 mg/dL  FrStHsLb   Creatinine 0.88 0.66 - 1.25 mg/dL  FrStHsLb   GFR Estimate 87 >60 mL/min/1.7m2  FrStHsLb   Comment:  Non  GFR Calc   GFR Estimate If Black >90 >60 mL/min/1.7m2  FrStHsLb   Comment:  African American GFR Calc   Calcium 7.5 8.5 - 10.1 mg/dL L FrStHsLb            INR [[id number]]  Resulted: 07/04/18 0915, Result status: Final result    Ordering provider: Jane Platt PA-C  07/04/18 0001 Resulting lab: [facility]    Specimen Information    Type Source Collected On   Blood  07/04/18 0852          Components       Value Reference Range Flag Lab   INR 1.07 0.86 - 1.14  FrStHsLb            CBC with platelets differential [[id number]] (Abnormal)  Resulted: 07/04/18 0912, Result status: Final result    Ordering provider: Jane Platt PA-C  07/04/18 0001 Resulting lab: [facility]    Specimen Information    Type Source Collected On   Blood  07/04/18 0852          Components       Value Reference Range Flag Lab   WBC 3.6 4.0 - 11.0 10e9/L L FrStHsLb   RBC Count 3.41 4.4 - 5.9 10e12/L L FrStHsLb   Hemoglobin 10.2 13.3 - 17.7 g/dL L FrStHsLb   Hematocrit 30.8 40.0 - 53.0 % L FrStHsLb   MCV 90 78 - 100 fl  FrStHsLb   MCH 29.9 26.5 - 33.0 pg  FrStHsLb   MCHC 33.1 31.5 - 36.5 g/dL  FrStHsLb   RDW 16.5 10.0 - 15.0 % H FrStHsLb   Platelet Count 86 150 - 450 10e9/L L FrStHsLb   Diff Method Automated Method   FrStHsLb   % Neutrophils 69.6 %  FrStHsLb   % Lymphocytes 17.2 %  FrStHsLb   % Monocytes 10.1 %  FrStHsLb   % Eosinophils 1.1 %  FrStHsLb   % Basophils 1.4 %  FrStHsLb   % Immature Granulocytes 0.6 %  FrStHsLb   Nucleated RBCs 0 0 /100  FrStHsLb   Absolute Neutrophil 2.5 1.6 - 8.3 10e9/L  FrStHsLb   Absolute Lymphocytes 0.6 0.8 - 5.3 10e9/L L FrStHsLb   Absolute  Monocytes 0.4 0.0 - 1.3 10e9/L  FrStHsLb   Absolute Eosinophils 0.0 0.0 - 0.7 10e9/L  FrStHsLb   Absolute Basophils 0.1 0.0 - 0.2 10e9/L  FrStHsLb   Abs Immature Granulocytes 0.0 0 - 0.4 10e9/L  FrStHsLb   Absolute Nucleated RBC 0.0   FrStHsLb            Procalcitonin [[id number]]  Resulted: 07/03/18 1628, Result status: Final result    Ordering provider: Jane Platt PA-C  07/03/18 1510 Resulting lab: [facility]    Specimen Information    Type Source Collected On     07/03/18 1510          Components       Value Reference Range Flag Lab   Procalcitonin 1.29 ng/ml  FrStHsLb   Comment:         0.50-1.99 ng/ml  Moderate risk of systemic infection.  Recommendation:   Recommend antibiotics. Evaluate culture results and clinical features to   target antibacterial therapy. Obtain blood cultures and other relevant   cultures if not done.  If empiric antibiotics were started, recheck PCT in: 2 days to guide   antibiotic de-escalation.  Discontinue or de-escalate antibiotics when PCT   concentration is <80% of peak or abs PCT <0.5. If empiric antibiotics were NOT   started, recheck PCT in 6-24 hours to re-evaluate need for antibiotics.              Hepatitis B Surface Antibody [[id number]]  Resulted: 07/03/18 1539, Result status: In process    Ordering provider: Jane Platt PA-C  07/03/18 1256 Resulting lab: [facility]    Specimen Information    Type Source Collected On   Blood  07/03/18 1510            Hepatitis B surface antigen [[id number]]  Resulted: 07/03/18 1539, Result status: In process    Ordering provider: Jane Platt PA-C  07/03/18 1256 Resulting lab: [facility]    Specimen Information    Type Source Collected On   Blood  07/03/18 1510            Hepatitis C Screen Reflex to HCV RNA Quant and Genotype [[id number]]  Resulted: 07/03/18 1539, Result status: In process    Ordering provider: Jane Platt PA-C  07/03/18 1256 Resulting lab: [facility]    Specimen Information    Type Source  Collected On   Blood  07/03/18 1510            WBC Differential [[id number]] (Abnormal)  Resulted: 07/03/18 0831, Result status: Final result    Ordering provider: Chris Ortiz DO  07/03/18 0625 Resulting lab: [facility]    Specimen Information    Type Source Collected On     07/03/18 0625          Components       Value Reference Range Flag Lab   Diff Method Automated Method   FrStHsLb   % Neutrophils 74.0 %  FrStHsLb   % Lymphocytes 16.0 %  FrStHsLb   % Monocytes 7.1 %  FrStHsLb   % Eosinophils 1.4 %  FrStHsLb   % Basophils 1.1 %  FrStHsLb   % Immature Granulocytes 0.4 %  FrStHsLb   Absolute Neutrophil 2.1 1.6 - 8.3 10e9/L  FrStHsLb   Absolute Lymphocytes 0.5 0.8 - 5.3 10e9/L L FrStHsLb   Absolute Monocytes 0.2 0.0 - 1.3 10e9/L  FrStHsLb   Absolute Eosinophils 0.0 0.0 - 0.7 10e9/L  FrStHsLb   Absolute Basophils 0.0 0.0 - 0.2 10e9/L  FrStHsLb   Abs Immature Granulocytes 0.0 0 - 0.4 10e9/L  FrStHsLb            Basic metabolic panel [[id number]] (Abnormal)  Resulted: 07/03/18 0713, Result status: Final result    Ordering provider: Chris Ortiz DO  07/03/18 0001 Resulting lab: [facility]    Specimen Information    Type Source Collected On   Blood  07/03/18 0625          Components       Value Reference Range Flag Lab   Sodium 141 133 - 144 mmol/L  FrStHsLb   Potassium 3.6 3.4 - 5.3 mmol/L  FrStHsLb   Chloride 105 94 - 109 mmol/L  FrStHsLb   Carbon Dioxide 27 20 - 32 mmol/L  FrStHsLb   Anion Gap 9 3 - 14 mmol/L  FrStHsLb   Glucose 100 70 - 99 mg/dL H FrStHsLb   Urea Nitrogen 13 7 - 30 mg/dL  FrStHsLb   Creatinine 0.97 0.66 - 1.25 mg/dL  FrStHsLb   GFR Estimate 78 >60 mL/min/1.7m2  FrStHsLb   Comment:  Non  GFR Calc   GFR Estimate If Black >90 >60 mL/min/1.7m2  [facility]   Comment:  African American GFR Calc   Calcium 8.0 8.5 - 10.1 mg/dL L [facility]            Hepatic panel [[id number]] (Abnormal)  Resulted: 07/03/18 0713, Result status: Final result    Ordering  provider: Chris Ortiz,   07/03/18 0001 Resulting lab: [facility]    Specimen Information    Type Source Collected On   Blood  07/03/18 0625          Components       Value Reference Range Flag Lab   Bilirubin Direct 1.6 0.0 - 0.2 mg/dL H FrStHsLb   Bilirubin Total 2.3 0.2 - 1.3 mg/dL H FrStHsLb   Albumin 2.4 3.4 - 5.0 g/dL L FrStHsLb   Protein Total 6.1 6.8 - 8.8 g/dL L FrStHsLb   Alkaline Phosphatase 188 40 - 150 U/L H FrStHsLb    0 - 70 U/L H FrStHsLb   AST 99 0 - 45 U/L H FrStHsLb            CBC with platelets [[id number]] (Abnormal)  Resulted: 07/03/18 0644, Result status: Final result    Ordering provider: Chris Ortiz DO  07/03/18 0001 Resulting lab: [facility]    Specimen Information    Type Source Collected On   Blood  07/03/18 0625          Components       Value Reference Range Flag Lab   WBC 2.9 4.0 - 11.0 10e9/L L FrStHsLb   RBC Count 3.78 4.4 - 5.9 10e12/L L FrStHsLb   Hemoglobin 11.4 13.3 - 17.7 g/dL L FrStHsLb   Hematocrit 34.2 40.0 - 53.0 % L FrStHsLb   MCV 91 78 - 100 fl  FrStHsLb   MCH 30.2 26.5 - 33.0 pg  FrStHsLb   MCHC 33.3 31.5 - 36.5 g/dL  FrStHsLb   RDW 16.1 10.0 - 15.0 % H FrStHsLb   Platelet Count 87 150 - 450 10e9/L L FrStHsLb            UA with Microscopic [[id number]] (Abnormal)  Resulted: 07/02/18 1312, Result status: Final result    Ordering provider: Jany Willard MD  07/02/18 1215 Resulting lab: [facility]    Specimen Information    Type Source Collected On   Midstream Urine Urine clean catch 07/02/18 1258          Components       Value Reference Range Flag Lab   Color Urine Dark Yellow   FrStHsLb   Appearance Urine Clear   FrStHsLb   Glucose Urine Negative NEG^Negative mg/dL  FrStHsLb   Bilirubin Urine Small NEG^Negative A FrStHsLb   Comment:         This is an unconfirmed screening test result. A positive result may be false.   Ketones Urine 5 NEG^Negative mg/dL A FrStHsLb   Specific [place] Urine 1.016 1.003 - 1.035   FrStHsLb   Blood Urine Trace NEG^Negative A FrStHsLb   pH Urine 7.0 5.0 - 7.0 pH  FrStHsLb   Protein Albumin Urine 100 NEG^Negative mg/dL A FrStHsLb   Urobilinogen mg/dL 4.0 0.0 - 2.0 mg/dL H FrStHsLb   Nitrite Urine Negative NEG^Negative  FrStHsLb   Leukocyte Esterase Urine Negative NEG^Negative  FrStHsLb   Source Midstream Urine   FrStHsLb   WBC Urine 2 0 - 5 /HPF  FrStHsLb   RBC Urine 1 0 - 2 /HPF  FrStHsLb   Mucous Urine Present NEG^Negative /LPF A FrStHsLb            Lactic acid whole blood [[id number]]  Resulted: 07/02/18 1306, Result status: Final result    Ordering provider: Jany Willard MD  07/02/18 1253 Resulting lab: [facility]    Specimen Information    Type Source Collected On   Blood  07/02/18 1200          Components       Value Reference Range Flag Lab   Lactic Acid 1.1 0.7 - 2.0 mmol/L  FrStHsLb            Comprehensive metabolic panel [[id number]] (Abnormal)  Resulted: 07/02/18 1252, Result status: Final result    Ordering provider: Jany Willard MD  07/02/18 1215 [facility] [facility]    Specimen Information    Type Source Collected On   Blood  07/02/18 1200          Components       Value Reference Range Flag Lab   Sodium 133 133 - 144 mmol/L  FrStHsLb   Potassium 4.2 3.4 - 5.3 mmol/L  FrStHsLb   Comment:  Specimen slightly hemolyzed, potassium may be falsely elevated   Chloride 98 94 - 109 mmol/L  FrStHsLb   Carbon Dioxide 25 20 - 32 mmol/L  FrStHsLb   Anion Gap 10 3 - 14 mmol/L  FrStHsLb   Glucose 100 70 - 99 mg/dL H FrStHsLb   Urea Nitrogen 15 7 - 30 mg/dL  FrStHsLb   Creatinine 0.91 0.66 - 1.25 mg/dL  FrStHsLb   GFR Estimate 84 >60 mL/min/1.7m2  FrStHsLb   Comment:  Non  GFR Calc   GFR Estimate If Black >90 >60 mL/min/1.7m2  FrStHsLb   Comment:  African American GFR Calc   Calcium 8.2 8.5 - 10.1 mg/dL L FrStHsLb   Bilirubin Total 2.3 0.2 - 1.3 mg/dL H FrStHsLb   Albumin 2.9 3.4 - 5.0 g/dL L FrStHsLb   Protein Total 7.3 6.8 - 8.8 g/dL  FrStHsLb    Alkaline Phosphatase 210 40 - 150 U/L H FrStHsLb    0 - 70 U/L H FrStHsLb    0 - 45 U/L H FrStHsLb            Lipase [[id number]]  Resulted: 07/02/18 1252, Result status: Final result    Ordering provider: Jany Willard MD  07/02/18 1215 Resulting lab: [facility]    Specimen Information    Type Source Collected On   Blood  07/02/18 1200          Components       Value Reference Range Flag Lab   Lipase 212 73 - 393 U/L  FrStHsLb            CBC with platelets differential [[id number]] (Abnormal)  Resulted: 07/02/18 1244, Result status: Final result    Ordering provider: Jany Willard MD  07/02/18 1215 Resulting lab: [facility]    Specimen Information    Type Source Collected On   Blood  07/02/18 1200          Components       Value Reference Range Flag Lab   WBC 3.0 4.0 - 11.0 10e9/L L FrStHsLb   RBC Count 3.91 4.4 - 5.9 10e12/L L FrStHsLb   Hemoglobin 11.7 13.3 - 17.7 g/dL L FrStHsLb   Hematocrit 35.1 40.0 - 53.0 % L FrStHsLb   MCV 90 78 - 100 fl  FrStHsLb   MCH 29.9 26.5 - 33.0 pg  FrStHsLb   MCHC 33.3 31.5 - 36.5 g/dL  FrStHsLb   RDW 15.8 10.0 - 15.0 % H FrStHsLb   Platelet Count 104 150 - 450 10e9/L L FrStHsLb   Diff Method Automated Method   FrStHsLb   % Neutrophils 73.2 %  FrStHsLb   % Lymphocytes 16.5 %  FrStHsLb   % Monocytes 9.3 %  FrStHsLb   % Eosinophils 0.0 %  FrStHsLb   % Basophils 0.7 %  FrStHsLb   % Immature Granulocytes 0.3 %  FrStHsLb   Nucleated RBCs 0 0 /100  FrStHsLb   Absolute Neutrophil 2.1 1.6 - 8.3 10e9/L  FrStHsLb   Absolute Lymphocytes 0.5 0.8 - 5.3 10e9/L L FrStHsLb   Absolute Monocytes 0.3 0.0 - 1.3 10e9/L  FrStHsLb   Absolute Eosinophils 0.0 0.0 - 0.7 10e9/L  FrStHsLb   Absolute Basophils 0.0 0.0 - 0.2 10e9/L  FrStHsLb   Abs Immature Granulocytes 0.0 0 - 0.4 10e9/L  FrStHsLb   Absolute Nucleated RBC 0.0   FrStHsLb            Testing Performed By     Lab - Abbreviation Name Director Address Valid Date Range    14 - FrStHsLb [facility] Unknown [address] [place] [address] 05/08/15 1057 - Present    45 - YSR416 MISYS Unknown Unknown 01/28/02 0000 - Present    75 - Unknown [facility] Unknown [address] [facility] [address] 01/15/15 1019 - Present    88 - Unknown [facility] Unknown Unknown 10/30/02 0000 - Present    225 - Unknown [facility] Unknown [address] [facility] [address] 12/19/14 0954 - Present               Imaging Results - 3 Days      US Abdomen Complete [[id number]]  Resulted: 07/04/18 1100, Result status: Final result    Ordering provider: Ramon Hackett DO  07/03/18 1433 Resulted by: Neo Jiang MD    Performed: 07/04/18 0737 - 07/04/18 0814 Resulting lab: RADIOLOGY RESULTS    Narrative:       ULTRASOUND ABDOMEN COMPLETE 7/4/2018 8:14 AM     HISTORY: Elevated liver function tests.      COMPARISON: None.    FINDINGS:  Liver is normal in echogenicity without focal lesions.  Gallbladder is surgically absent. Extrahepatic bile duct normal in  diameter measuring up to 0.5 cm. Pancreas is normal where visualized.  Spleen is normal. Kidneys are normal in size. There is no  hydronephrosis. Proximal abdominal aorta is unremarkable. Trace  bilateral pleural effusions.      Impression:       IMPRESSION: Trace bilateral pleural effusions, otherwise unremarkable.  No evidence of biliary dilatation.    NEO JIANG MD      XR Chest 2 Views [[id number]]  Resulted: 07/02/18 1452, Result status: Final result    Ordering provider: Jany Willard MD  07/02/18 1215 Resulted by: Neo Rodriguez MD    Performed: 07/02/18 1315 - 07/02/18 1347 Resulting lab: RADIOLOGY RESULTS    Narrative:       XR CHEST 2 VW 7/2/2018 1:47 PM    HISTORY: Cough and fever.    COMPARISON: None.    FINDINGS: Left basilar opacity. Right lung is clear. No pleural  effusion or pneumothorax. Normal heart size.      Impression:       IMPRESSION: Left basilar pneumonia.    NEO RODRIGUEZ MD       CT Chest/Abdomen/Pelvis w Contrast [[id number]]  Resulted: 07/02/18 1415, Result status: Final result    Ordering provider: Jany Willard MD  07/02/18 1228 Resulted by: Mando Conley MD    Performed: 07/02/18 1338 - 07/02/18 1350 Resulting lab: RADIOLOGY RESULTS    Narrative:       CT CHEST/ABDOMEN/PELVIS WITH CONTRAST 7/2/2018 1:50 PM     HISTORY:  Fever, cough, nausea and vomiting, abdominal tenderness;  oral water prep.      TECHNIQUE: Axial images from the thoracic inlet to the symphysis are  performed with additional coronal reformatted images. 91 mL of Isovue  370 are given intravenously.  Radiation dose for this scan was reduced  using automated exposure control, adjustment of the mA and/or kV  according to patient size, or iterative reconstruction technique.    FINDINGS:     Chest: Subtle infiltrate is noted at the lateral left lung base  concerning for pneumonia. Atelectasis is also noted at the posterior  costophrenic angles bilaterally. No pleural or pericardial fluid. The  heart is normal in size. The esophagus is unremarkable. Thyroid gland  is prominent in size with a few tiny subcentimeter cysts of doubtful  clinical significance. No enlarged lymph nodes in the chest or  axillas. Thoracic and abdominal aorta are unremarkable. No evidence of  aneurysm or dissection. Central pulmonary arteries are patent.    Abdomen: Prior cholecystectomy changes. The liver, spleen, pancreas,  adrenal glands and kidneys are unremarkable. No hydronephrosis. Trace  amount of ascites is noted in the region of the right paracolic  gutter. The bowel is normal in caliber without obstruction,  diverticulitis or appendicitis. No enlarged abdominal lymph nodes.    Pelvis: Prostate gland is mildly enlarged. The bladder and rectum are  unremarkable. No enlarged pelvic or inguinal lymph nodes. Trace amount  of free pelvic fluid is noted.      Impression:       IMPRESSION:  1. Bibasilar atelectasis with probable additional  infiltrate left lung  base concerning for pneumonia. Lungs are otherwise clear.  2. Trace amount of fluid right paracolic gutter and pelvis without  obvious etiology. Abdominal and pelvic organs are within normal  limits. No bowel obstruction, diverticulitis or appendicitis.  3. Prior cholecystectomy changes.    CASSIA SU MD      Testing Performed By     Lab - Abbreviation Name Director Address Valid Date Range    104 - Rad Rslts RADIOLOGY RESULTS Unknown Unknown 05 1553 - Present               ECG/EMG Results      Echocardiogram Complete [[id number]]  Resulted: 18, Result status: Edited Result - FINAL    Ordering provider: Monae Chong DO  18 1143 Resulted by: Randolph Gaitan MD    Performed: 18 - 18 Resulting lab: RADIOLOGY RESULTS    Narrative:       [id number]  [id number]  [id number]  [id number]^ARLETTE^MONAE^FLOR           [facility]  Echocardiography Laboratory  [address] [place] [address]        Name: JAMIR BOYLE  MRN: [id number]  : 1955  Study Date: 2018 08:21 AM  Age: 63 yrs  Gender: Male  Patient Location: [facility]  Reason For Study: SOB  Ordering Physician: MONAE CHONG  Referring Physician: MONAE CHONG  Performed By: Veronica Walton RDCS     BSA: 2.1 m2  Height: 75 in  Weight: 181 lb  HR: 85  BP: 112/66 mmHg  _____________________________________________________________________________  __        Procedure  Complete Echo Adult. Contrast Lumason.  _____________________________________________________________________________  __        Interpretation Summary     1. The left ventricle is normal in structure, function and size. The visual  ejection fraction is estimated at 60%.  2. The right ventricle is normal in structure, function and size.  3. No valve disease.     No previous echo for comparison.  _____________________________________________________________________________  __        Left  Ventricle  The left ventricle is normal in structure, function and size. There is normal  left ventricular wall thickness. Left ventricular diastolic function is  normal. The visual ejection fraction is estimated at 60%. Diastolic Doppler  findings (E/E' ratio and/or other parameters) suggest left ventricular filling  pressures are normal. Normal left ventricular wall motion.     Right Ventricle  The right ventricle is normal in structure, function and size.     Atria  The left atrium is moderately dilated. Right atrial size is normal. There is  no atrial shunt seen.     Mitral Valve  The mitral valve is normal in structure and function. There is mild (1+)  mitral regurgitation.        Tricuspid Valve  The tricuspid valve is normal in structure and function. There is trace  tricuspid regurgitation. Right ventricular systolic pressure could not be  approximated due to inadequate tricuspid regurgitation.     Aortic Valve  The aortic valve is normal in structure and function.     Pulmonic Valve  The pulmonic valve is normal in structure and function.     Vessels  Normal ascending, transverse (arch), and descending aorta. The IVC is normal  in size and reactivity with respiration, suggesting normal central venous  pressure.     Pericardium  There is no pericardial effusion.        Rhythm  Sinus rhythm was noted.  _____________________________________________________________________________  __  MMode/2D Measurements & Calculations  IVSd: 0.96 cm     LVIDd: 5.6 cm  LVIDs: 3.6 cm  LVPWd: 1.3 cm  FS: 35.4 %  LV mass(C)d: 264.0 grams  LV mass(C)dI: 125.5 grams/m2  Ao root diam: 3.6 cm  LA dimension: 4.9 cm  asc Aorta Diam: 3.7 cm  LA/Ao: 1.4  LVOT diam: 2.3 cm  LVOT area: 4.3 cm2  LA Volume (BP): 99.1 ml  LA Volume Index (BP): 47.2 ml/m2  RWT: 0.48           Doppler Measurements & Calculations  MV E max yaakov: 99.4 cm/sec  MV A max yaakov: 85.9 cm/sec  MV E/A: 1.2  MV dec time: 0.23 sec  E/E' av.5  Lateral E/e': 7.1  Medial  E/e': 9.9           _____________________________________________________________________________  __           Report approved by: Glendy Roque 07/05/2018 10:28 AM       1    Type Source Collected On     07/05/18 0821          View Image (below)              Encounter-Level Documents:     There are no encounter-level documents.      Order-Level Documents:     There are no order-level documents.

## 2018-07-02 NOTE — IP AVS SNAPSHOT
[facility] [address] [facility]    [address] [place] [address]    Phone:  [phone]                                       After Visit Summary   7/2/2018    Elmer Carter    MRN: [id number]           After Visit Summary Signature Page     I have received my discharge instructions, and my questions have been answered. I have discussed any challenges I see with this plan with the nurse or doctor.    ..........................................................................................................................................  Patient/Patient Representative Signature      ..........................................................................................................................................  Patient Representative Print Name and Relationship to Patient    ..................................................               ................................................  Date                                            Time    ..........................................................................................................................................  Reviewed by Signature/Title    ...................................................              ..............................................  Date                                                            Time

## 2018-07-03 PROBLEM — J18.9 PNEUMONIA: Status: ACTIVE | Noted: 2018-07-03

## 2018-07-03 LAB
ALBUMIN SERPL-MCNC: 2.4 G/DL (ref 3.4–5)
ALP SERPL-CCNC: 188 U/L (ref 40–150)
ALT SERPL W P-5'-P-CCNC: 181 U/L (ref 0–70)
ANION GAP SERPL CALCULATED.3IONS-SCNC: 9 MMOL/L (ref 3–14)
AST SERPL W P-5'-P-CCNC: 99 U/L (ref 0–45)
BASOPHILS # BLD AUTO: 0 10E9/L (ref 0–0.2)
BASOPHILS NFR BLD AUTO: 1.1 %
BILIRUB DIRECT SERPL-MCNC: 1.6 MG/DL (ref 0–0.2)
BILIRUB SERPL-MCNC: 2.3 MG/DL (ref 0.2–1.3)
BUN SERPL-MCNC: 13 MG/DL (ref 7–30)
CALCIUM SERPL-MCNC: 8 MG/DL (ref 8.5–10.1)
CHLORIDE SERPL-SCNC: 105 MMOL/L (ref 94–109)
CO2 SERPL-SCNC: 27 MMOL/L (ref 20–32)
CREAT SERPL-MCNC: 0.97 MG/DL (ref 0.66–1.25)
DIFFERENTIAL METHOD BLD: ABNORMAL
EOSINOPHIL # BLD AUTO: 0 10E9/L (ref 0–0.7)
EOSINOPHIL NFR BLD AUTO: 1.4 %
ERYTHROCYTE [DISTWIDTH] IN BLOOD BY AUTOMATED COUNT: 16.1 % (ref 10–15)
GFR SERPL CREATININE-BSD FRML MDRD: 78 ML/MIN/1.7M2
GLUCOSE SERPL-MCNC: 100 MG/DL (ref 70–99)
HCT VFR BLD AUTO: 34.2 % (ref 40–53)
HGB BLD-MCNC: 11.4 G/DL (ref 13.3–17.7)
IMM GRANULOCYTES # BLD: 0 10E9/L (ref 0–0.4)
IMM GRANULOCYTES NFR BLD: 0.4 %
LYMPHOCYTES # BLD AUTO: 0.5 10E9/L (ref 0.8–5.3)
LYMPHOCYTES NFR BLD AUTO: 16 %
MCH RBC QN AUTO: 30.2 PG (ref 26.5–33)
MCHC RBC AUTO-ENTMCNC: 33.3 G/DL (ref 31.5–36.5)
MCV RBC AUTO: 91 FL (ref 78–100)
MONOCYTES # BLD AUTO: 0.2 10E9/L (ref 0–1.3)
MONOCYTES NFR BLD AUTO: 7.1 %
NEUTROPHILS # BLD AUTO: 2.1 10E9/L (ref 1.6–8.3)
NEUTROPHILS NFR BLD AUTO: 74 %
PLATELET # BLD AUTO: 87 10E9/L (ref 150–450)
POTASSIUM SERPL-SCNC: 3.6 MMOL/L (ref 3.4–5.3)
PROCALCITONIN SERPL-MCNC: 1.29 NG/ML
PROT SERPL-MCNC: 6.1 G/DL (ref 6.8–8.8)
RBC # BLD AUTO: 3.78 10E12/L (ref 4.4–5.9)
SODIUM SERPL-SCNC: 141 MMOL/L (ref 133–144)
WBC # BLD AUTO: 2.9 10E9/L (ref 4–11)

## 2018-07-03 PROCEDURE — 86706 HEP B SURFACE ANTIBODY: CPT | Performed by: PHYSICIAN ASSISTANT

## 2018-07-03 PROCEDURE — 40000275 ZZH STATISTIC RCP TIME EA 10 MIN

## 2018-07-03 PROCEDURE — 80076 HEPATIC FUNCTION PANEL: CPT | Performed by: INTERNAL MEDICINE

## 2018-07-03 PROCEDURE — 25000128 H RX IP 250 OP 636: Performed by: PHYSICIAN ASSISTANT

## 2018-07-03 PROCEDURE — 25000132 ZZH RX MED GY IP 250 OP 250 PS 637: Mod: GY | Performed by: PHYSICIAN ASSISTANT

## 2018-07-03 PROCEDURE — 94640 AIRWAY INHALATION TREATMENT: CPT | Mod: 76

## 2018-07-03 PROCEDURE — 85004 AUTOMATED DIFF WBC COUNT: CPT | Performed by: INTERNAL MEDICINE

## 2018-07-03 PROCEDURE — 80048 BASIC METABOLIC PNL TOTAL CA: CPT | Performed by: INTERNAL MEDICINE

## 2018-07-03 PROCEDURE — 25000128 H RX IP 250 OP 636: Performed by: INTERNAL MEDICINE

## 2018-07-03 PROCEDURE — G0472 HEP C SCREEN HIGH RISK/OTHER: HCPCS | Performed by: PHYSICIAN ASSISTANT

## 2018-07-03 PROCEDURE — A9270 NON-COVERED ITEM OR SERVICE: HCPCS | Mod: GY | Performed by: INTERNAL MEDICINE

## 2018-07-03 PROCEDURE — 36415 COLL VENOUS BLD VENIPUNCTURE: CPT | Performed by: INTERNAL MEDICINE

## 2018-07-03 PROCEDURE — 12000000 ZZH R&B MED SURG/OB

## 2018-07-03 PROCEDURE — A9270 NON-COVERED ITEM OR SERVICE: HCPCS | Mod: GY | Performed by: PHYSICIAN ASSISTANT

## 2018-07-03 PROCEDURE — 36415 COLL VENOUS BLD VENIPUNCTURE: CPT | Performed by: PHYSICIAN ASSISTANT

## 2018-07-03 PROCEDURE — 84145 PROCALCITONIN (PCT): CPT | Performed by: PHYSICIAN ASSISTANT

## 2018-07-03 PROCEDURE — 99233 SBSQ HOSP IP/OBS HIGH 50: CPT | Performed by: PHYSICIAN ASSISTANT

## 2018-07-03 PROCEDURE — 25000125 ZZHC RX 250: Performed by: INTERNAL MEDICINE

## 2018-07-03 PROCEDURE — 94640 AIRWAY INHALATION TREATMENT: CPT

## 2018-07-03 PROCEDURE — 25000132 ZZH RX MED GY IP 250 OP 250 PS 637: Mod: GY | Performed by: INTERNAL MEDICINE

## 2018-07-03 PROCEDURE — G0499 HEPB SCREEN HIGH RISK INDIV: HCPCS | Performed by: PHYSICIAN ASSISTANT

## 2018-07-03 PROCEDURE — 85027 COMPLETE CBC AUTOMATED: CPT | Performed by: INTERNAL MEDICINE

## 2018-07-03 PROCEDURE — G0378 HOSPITAL OBSERVATION PER HR: HCPCS

## 2018-07-03 RX ORDER — SENNOSIDES 8.6 MG
8.6 TABLET ORAL 2 TIMES DAILY PRN
Status: DISCONTINUED | OUTPATIENT
Start: 2018-07-03 | End: 2018-07-05

## 2018-07-03 RX ORDER — SODIUM CHLORIDE 9 MG/ML
INJECTION, SOLUTION INTRAVENOUS CONTINUOUS
Status: DISCONTINUED | OUTPATIENT
Start: 2018-07-03 | End: 2018-07-04

## 2018-07-03 RX ADMIN — SENNOSIDES 8.6 MG: 8.6 TABLET, FILM COATED ORAL at 17:34

## 2018-07-03 RX ADMIN — SODIUM CHLORIDE, POTASSIUM CHLORIDE, SODIUM LACTATE AND CALCIUM CHLORIDE: 600; 310; 30; 20 INJECTION, SOLUTION INTRAVENOUS at 17:34

## 2018-07-03 RX ADMIN — ALBUTEROL SULFATE 2.5 MG: 2.5 SOLUTION RESPIRATORY (INHALATION) at 08:29

## 2018-07-03 RX ADMIN — SENNOSIDES 8.6 MG: 8.6 TABLET, FILM COATED ORAL at 10:55

## 2018-07-03 RX ADMIN — SODIUM CHLORIDE: 9 INJECTION, SOLUTION INTRAVENOUS at 18:06

## 2018-07-03 RX ADMIN — ALBUTEROL SULFATE 2.5 MG: 2.5 SOLUTION RESPIRATORY (INHALATION) at 15:22

## 2018-07-03 RX ADMIN — IBUPROFEN 600 MG: 600 TABLET ORAL at 17:36

## 2018-07-03 RX ADMIN — ALBUTEROL SULFATE 2.5 MG: 2.5 SOLUTION RESPIRATORY (INHALATION) at 12:16

## 2018-07-03 RX ADMIN — SODIUM CHLORIDE, POTASSIUM CHLORIDE, SODIUM LACTATE AND CALCIUM CHLORIDE: 600; 310; 30; 20 INJECTION, SOLUTION INTRAVENOUS at 10:07

## 2018-07-03 RX ADMIN — SODIUM CHLORIDE, POTASSIUM CHLORIDE, SODIUM LACTATE AND CALCIUM CHLORIDE: 600; 310; 30; 20 INJECTION, SOLUTION INTRAVENOUS at 02:27

## 2018-07-03 RX ADMIN — ALBUTEROL SULFATE 2.5 MG: 2.5 SOLUTION RESPIRATORY (INHALATION) at 19:38

## 2018-07-03 RX ADMIN — AZITHROMYCIN MONOHYDRATE 250 MG: 250 TABLET ORAL at 17:34

## 2018-07-03 RX ADMIN — IBUPROFEN 600 MG: 600 TABLET ORAL at 10:55

## 2018-07-03 RX ADMIN — CEFTRIAXONE SODIUM 2 G: 2 INJECTION, POWDER, FOR SOLUTION INTRAMUSCULAR; INTRAVENOUS at 14:48

## 2018-07-03 ASSESSMENT — ACTIVITIES OF DAILY LIVING (ADL)
RETIRED_EATING: 0-->INDEPENDENT
TOILETING: 0-->INDEPENDENT
RETIRED_COMMUNICATION: 0-->UNDERSTANDS/COMMUNICATES WITHOUT DIFFICULTY
AMBULATION: 0-->INDEPENDENT
FALL_HISTORY_WITHIN_LAST_SIX_MONTHS: NO
DRESS: 0-->INDEPENDENT
COGNITION: 0 - NO COGNITION ISSUES REPORTED
TRANSFERRING: 0-->INDEPENDENT
BATHING: 0-->INDEPENDENT
SWALLOWING: 0-->SWALLOWS FOODS/LIQUIDS WITHOUT DIFFICULTY

## 2018-07-03 NOTE — PLAN OF CARE
Problem: Patient Care Overview  Goal: Plan of Care/Patient Progress Review  Outcome: Improving  PRIMARY DIAGNOSIS: PNEUMONIA  OUTPATIENT/OBSERVATION GOALS TO BE MET BEFORE DISCHARGE:  1. Dyspnea improved and O2 sats >88% on RA or back to baseline O2 levels: Yes   SpO2: 95 %, O2 Device: None (Room air)    2. Tolerating oral abx or appropriate plans made outpatient infusion: Yes    3. Vitals signs normal or return to baseline: No    4. Short term supplemental O2 needed with activity at home: No    5. Tolerate oral intake to maintain hydration: Yes    6. Return to near baseline physical activity: No    Discharge Planner Nurse   Safe discharge environment identified: No  Barriers to discharge: Yes       Entered by: Vaishnavi Sullivan 07/03/2018 8:25 AM     Please review provider order for any additional goals.   Nurse to notify provider when observation goals have been met and patient is ready for discharge.

## 2018-07-03 NOTE — H&P
[facility]    Hospitalist History and Physical    Name: Elmer Carter    MRN: [id number]  YOB: 1955    Age: 63 year old  Date of Admission:  7/2/2018    Assessment & Plan   Elmer Carter is a 63 year old male visiting from [place] with his family who [facility] the [facility]  with fevers and chills.  He was diagnosed in the ER with a pneumonia.    1.  Community acquired pneumonia:  -  No hypoxia but fairly high fevers.  Plan OBS status initially with some IVF and tylenol/ibuprofen and ceftriaxone + zithromax.  If he worsens and shows signs of sepsis or worsening hypoxia I would admit him to inpt status.  If he quickly improves, he could go home tomorrow afternoon or the next day as he hopes to d/c quickly.  -  Empiric nebs initially for reactive airway component  -  Mild pancytopenia likely infection response.  I will recheck a CBC in the AM.    2.  BPH:  -  Flomax    3.  Hypothyroidism:  -  Hold levothyroxine initially here, can resume at discharge from OBS    4.  Elevated LFT's:  -  Mildly elevated, unclear etiology.  His ABD exam is benign and his Chest imaging suggests a pneumonia.  Minimize further tylenol tonight.  He was not using it excessively by report though.  He reports only 4 gm over the past 2 days total.  Recheck LFT's in the AM.  I will try a regular diet, further eval if numbers worsen/he cannot tolerate diet.    DVT Prophylaxis: Pneumatic Compression Devices  Code Status: Full Code    Disposition: Expected discharge in 1-2 days once fevers/chills improved.    Primary Care Physician   Physician No Ref-Primary       Chief Complaint   Cough, fevers, chills    History is obtained from the patient.  I also spoke with the ER provider about the history.     History of Present Illness   Elmer Carter is a 63 year old male visiting from [place] with his family who presented to the Critical access hospital  with fevers and chills.  He was diagnosed in the ER with a pneumonia.  He flew in with  his family Friday and started having chills.  These worsened into sweats and rigors over the weekend.  He also had a worsening non-productive cough.  No chest pain, emesis.  He has had a decreased appetite though.  No other acute complaints.  No other known sick contacts.  Denies asthma/COPD, other recent infections or major medical problems.  He is planning to go back to [place] in a few days.     Past Medical History    Hypothyroidism  BPH  Reflux    Past Surgical History   Past Surgical History:   Procedure Laterality Date     CHOLECYSTECTOMY       ORTHOPEDIC SURGERY         Prior to Admission Medications   Prior to Admission Medications   Prescriptions Last Dose Informant Patient Reported? Taking?   Celecoxib (CELEBREX PO) Past Month at Unknown time  Yes Yes   Sig: Take 100 mg by mouth 2 times daily as needed for moderate pain   IBUPROFEN PO Past Week at Unknown time  Yes Yes   Sig: Take 400 mg by mouth every 6 hours as needed for moderate pain   Levothyroxine Sodium (SYNTHROID PO) 7/2/2018 at am  Yes Yes   Sig: Take 75 mcg by mouth daily    OMEPRAZOLE PO Past Week at Unknown time  Yes Yes   Sig: Take 20 mg by mouth daily as needed (Takes when taking ibuprofen)   TURMERIC CURCUMIN PO 7/1/2018 at Unknown time  Yes Yes   Sig: Take by mouth daily   VITAMIN D, CHOLECALCIFEROL, PO 7/1/2018 at Unknown time  Yes Yes   Sig: Take 1,000 Units by mouth daily   tamsulosin (FLOMAX) 0.4 MG capsule 7/1/2018 at hs  Yes Yes   Sig: Take 0.4 mg by mouth At Bedtime       Facility-Administered Medications: None     Allergies   No Known Allergies    Social History   Social History   Substance Use Topics     Smoking status: Never Smoker     Smokeless tobacco: Never Used     Alcohol use No   Retired Environmental .    Family History   Reviewed, non-contributory.    Review of Systems   A Comprehensive greater than 10 system review of systems was carried out.  Pertinent positives and negatives are noted above.   Otherwise negative for contributory information.    Physical Exam   Temp: 103.1  F (39.5  C) Temp src: Oral BP: 112/47 Pulse: 89 Heart Rate: 92 Resp: 18 SpO2: 90 % O2 Device: None (Room air)    Vital Signs with Ranges  Temp:  [101.9  F (38.8  C)-103.1  F (39.5  C)] 103.1  F (39.5  C)  Pulse:  [89] 89  Heart Rate:  [87-92] 92  Resp:  [11-22] 18  BP: (112-129)/(47-75) 112/47  SpO2:  [89 %-97 %] 90 %  181 lbs 0 oz    GEN:  Alert, oriented x 3, appears ill but comfortable, no overt distress.  HEENT:  Normocephalic/atraumatic, no scleral icterus, no nasal discharge, mouth moist.  CV:  Regular rate and rhythm, no murmur or JVD.  S1 + S2 noted, no S3 or S4.  LUNGS:  Left base to mid lung crackles, mildly decreased base sounds.  No wheezes/retractions.  Symmetric chest rise on inhalation noted.  ABD:  Active bowel sounds, soft, non-tender/non-distended.  No rebound/guarding/rigidity.  EXT: No edema.  No cyanosis.  No acute joint synovitis noted.  SKIN:  Dry to touch, no exanthems noted in the visualized areas.  NEURO:  Moves extremities well on general exam, sensation to touch grossly intact.  No new focal deficits appreciated.    Data   Data reviewed today:  I personally reviewed the chest x-ray image(s) showing left infiltrate.      Recent Labs  Lab 07/02/18  1200   WBC 3.0*   HGB 11.7*   HCT 35.1*   MCV 90   *       Recent Labs  Lab 07/02/18  1251 07/02/18  1205   CULT No growth after 3 hours No growth after 3 hours       Recent Labs  Lab 07/02/18  1200      POTASSIUM 4.2   CHLORIDE 98   CO2 25   ANIONGAP 10   *   BUN 15   CR 0.91   GFRESTIMATED 84   GFRESTBLACK >90   NOELLE 8.2*   PROTTOTAL 7.3   ALBUMIN 2.9*   BILITOTAL 2.3*   ALKPHOS 210*   *   *       Recent Results (from the past 24 hour(s))   XR Chest 2 Views    Narrative    XR CHEST 2 VW 7/2/2018 1:47 PM    HISTORY: Cough and fever.    COMPARISON: None.    FINDINGS: Left basilar opacity. Right lung is clear. No pleural  effusion or  pneumothorax. Normal heart size.      Impression    IMPRESSION: Left basilar pneumonia.    ANA SILVER MD   CT Chest/Abdomen/Pelvis w Contrast    Narrative    CT CHEST/ABDOMEN/PELVIS WITH CONTRAST 7/2/2018 1:50 PM     HISTORY:  Fever, cough, nausea and vomiting, abdominal tenderness;  oral water prep.      TECHNIQUE: Axial images from the thoracic inlet to the symphysis are  performed with additional coronal reformatted images. 91 mL of Isovue  370 are given intravenously.  Radiation dose for this scan was reduced  using automated exposure control, adjustment of the mA and/or kV  according to patient size, or iterative reconstruction technique.    FINDINGS:     Chest: Subtle infiltrate is noted at the lateral left lung base  concerning for pneumonia. Atelectasis is also noted at the posterior  costophrenic angles bilaterally. No pleural or pericardial fluid. The  heart is normal in size. The esophagus is unremarkable. Thyroid gland  is prominent in size with a few tiny subcentimeter cysts of doubtful  clinical significance. No enlarged lymph nodes in the chest or  axillas. Thoracic and abdominal aorta are unremarkable. No evidence of  aneurysm or dissection. Central pulmonary arteries are patent.    Abdomen: Prior cholecystectomy changes. The liver, spleen, pancreas,  adrenal glands and kidneys are unremarkable. No hydronephrosis. Trace  amount of ascites is noted in the region of the right paracolic  gutter. The bowel is normal in caliber without obstruction,  diverticulitis or appendicitis. No enlarged abdominal lymph nodes.    Pelvis: Prostate gland is mildly enlarged. The bladder and rectum are  unremarkable. No enlarged pelvic or inguinal lymph nodes. Trace amount  of free pelvic fluid is noted.      Impression    IMPRESSION:  1. Bibasilar atelectasis with probable additional infiltrate left lung  base concerning for pneumonia. Lungs are otherwise clear.  2. Trace amount of fluid right paracolic gutter and  pelvis without  obvious etiology. Abdominal and pelvic organs are within normal  limits. No bowel obstruction, diverticulitis or appendicitis.  3. Prior cholecystectomy changes.    CASSIA SU MD

## 2018-07-03 NOTE — PLAN OF CARE
Problem: Patient Care Overview  Goal: Plan of Care/Patient Progress Review  Outcome: No Change  PRIMARY DIAGNOSIS: PNEUMONIA  OUTPATIENT/OBSERVATION GOALS TO BE MET BEFORE DISCHARGE:  1. Dyspnea improved and O2 sats >88% on RA or back to baseline O2 levels: Yes   SpO2: 92 %, O2 Device: None (Room air)    2. Tolerating oral abx or appropriate plans made outpatient infusion: No    3. Vitals signs normal or return to baseline: Yes    4. Short term supplemental O2 needed with activity at home: No    5. Tolerate oral intake to maintain hydration: Yes    6. Return to near baseline physical activity: No    Discharge Planner Nurse   Safe discharge environment identified: No  Barriers to discharge: No       Entered by: Vaishnavi Sullivan 07/03/2018 2:22 PM     Please review provider order for any additional goals.   Nurse to notify provider when observation goals have been met and patient is ready for discharge.

## 2018-07-03 NOTE — PLAN OF CARE
Problem: Patient Care Overview  Goal: Plan of Care/Patient Progress Review  Outcome: No Change  Patient likes to be called Darrel. Patient independent. In for pneumonia. Full Code. Patient is A&Ox4. VSS. Incentive spirometer 3000. Prn Ibuprofen for headache which helps at times. Prn senna. Regular diet. Noticed a rash that is on patients torso and arms, provider is aware. Patient being admitted to [facility]. Report given to nurse.

## 2018-07-03 NOTE — PLAN OF CARE
Problem: Patient Care Overview  Goal: Plan of Care/Patient Progress Review  Outcome: Improving  Observation Goals  -diagnostic tests and consults completed and resulted - no  -vital signs normal or at patient baseline -no  -tolerating oral intake to maintain hydration -yes  -dyspnea improved and O2 sats greater than 88% on room air or prior home oxygen levels -yes  Pt A&O, VSS, pt had fever treated with Motrin fever now breaking.  Pt tolerating oral fluids and has IVF running currently at 150 cc/hr.  Pt voiding adequately.  Will continue to monitor.

## 2018-07-03 NOTE — PROGRESS NOTES
[facility]    Hospitalist Progress Note      Assessment & Plan   Elmer Carter is a 63 year old male visiting from [place] with a past medical history significant for BPH, remote PE (provoked after surgery), and hypothyroidism who was admitted on 7/2/2018 with pneumonia after presenting with fevers, chills, and shortness of breath.     Community acquired pneumonia. CXR and chest CR show LLL infiltrate. Patient presented with fevers and chills with tmax of 103. He has remained afebrile today. O2 sat stable on room air at rest. He continues to feel short of breath today. Also noted occasional cough, headache, and poor appetite. Was able to eat a little today. Procalcitonin is elevated at 1.29 suggesting moderate risk of systemic infection. Some viral features as well including rash and headache. WBC low at 2.9. BP soft but stable. BC remain negative.   --continue Ceftriaxone and azithromycin   --nebs prn, have been helpful today  --follow CBC   --continue IVF @125/hr    Mild pancytopenia. Stable from admission. Likely reactive in setting of infection. neutrophils WNL.   --follow CBC    Elevated LFTs. Trending slightly downward today though t. Bili remains elevated at 2.3 with direct bili of 1.6. Etiology is unclear. Abdominal exam is benign. CT abdomen shows trace fluid in pelvis and paracolic gutter. Tolerating regular diet. No jaundice. Denies significant PTA tylenol use. Denies alcohol use.   --US abdomen in the am  --follow LFTs  --hepatitis screen   --check INR    Diffuse macular rash. Does not look like drug rash or urticaria. No itching, pain. No associated throat tightness or increased SOB. Started this afternoon without clear precipitating factor.   --monitor clinically     Hypothyroidism. Resume levothyroxine    Remote hx of DVT. Occurred after knee surgery in 1987. No recurrence.     DVT Prophylaxis: Pneumatic Compression Devices  Code Status: Full Code    Disposition: Expected discharge  in 1-2 days pending clinical improvement     This patient was seen and examined with Dr. Hackett who agrees with the above plan.    Jane Platt PA-C    Interval History   Feeling a little better today. Still having some shortness of breath. Denies chest pain. Appetite is a little better today. Occasional cough. Denies abdominal pain or nausea. Continues to feel a little fatigued.     -Data reviewed today: I reviewed all new labs and imaging results over the last 24 hours. I personally reviewed no images or EKG's today.    Physical Exam   Temp: 98.5  F (36.9  C) Temp src: Oral BP: 99/54   Heart Rate: 77 Resp: 16 SpO2: 95 % O2 Device: None (Room air)    Vitals:    07/02/18 1132   Weight: 82.1 kg (181 lb)     Vital Signs with Ranges  Temp:  [97  F (36.1  C)-103.1  F (39.5  C)] 98.5  F (36.9  C)  Heart Rate:  [67-93] 77  Resp:  [11-18] 16  BP: ()/(47-71) 99/54  SpO2:  [90 %-96 %] 95 %  I/O last 3 completed shifts:  In: 2038.33 [P.O.:100; I.V.:1938.33]  Out: -     Constitutional: Alert and oriented x4, appears fatigued. Appropriately conversant   ENT: normal cephalic, moist mucous membranes  Eyes:  Pupils are equal and reactive to light, no scleral icterus   Respiratory: Lungs with crackles in the left base. Decreased in base on the right. No wheezing. No accessory muscle use or increased work of breathing.   Cardiovascular: Regular rate and rhythm, no murmur, no LE edema, distal pulses +2/4  GI: Normal active bowel sounds, abdomen soft, non-tender  Skin/Integumen: warm, dry. Macular rash present on chest at time of my exam.   MSK:  No joint pain or swelling. Moves all four extremities  Neuro:  Cranial nerves 2-12 grossly intact. No focal deficits. Speech is clear. No nuchal rigidity.      Medications     lactated ringers 150 mL/hr at 07/03/18 1734       albuterol  2.5 mg Nebulization 4x Daily     azithromycin  250 mg Oral QPM     cefTRIAXone  2 g Intravenous Q24H     tamsulosin  0.4 mg Oral At Bedtime        Data     Recent Labs  Lab 07/03/18  0625 07/02/18  1200   WBC 2.9* 3.0*   HGB 11.4* 11.7*   MCV 91 90   PLT 87* 104*    133   POTASSIUM 3.6 4.2   CHLORIDE 105 98   CO2 27 25   BUN 13 15   CR 0.97 0.91   ANIONGAP 9 10   NOELLE 8.0* 8.2*   * 100*   ALBUMIN 2.4* 2.9*   PROTTOTAL 6.1* 7.3   BILITOTAL 2.3* 2.3*   ALKPHOS 188* 210*   * 273*   AST 99* 193*   LIPASE  --  212       No results found for this or any previous visit (from the past 24 hour(s)).

## 2018-07-03 NOTE — PLAN OF CARE
Problem: Patient Care Overview  Goal: Plan of Care/Patient Progress Review  Outcome: Improving   Observation goals PRIOR TO DISCHARGE     Comments: -diagnostic tests and consults completed and resulted-No   -vital signs normal or at patient baseline-Yes  -tolerating oral intake to maintain hydration-Yes  -dyspnea improved and O2 sats greater than 88% on room air or prior home oxygen levels-Yes   Nurse to notify provider when observation goals have been met and patient is ready for discharge.  Pt A&O x4. Up with SBA/Independent. Tolerating fluids. Voiding adequately. Fever decreased,other VSS. LR infusing @125. Sats in upper 90's. Will continue to monitor .

## 2018-07-04 ENCOUNTER — APPOINTMENT (OUTPATIENT)
Dept: ULTRASOUND IMAGING | Facility: CLINIC | Age: 63
DRG: 871 | End: 2018-07-04
Attending: HOSPITALIST
Payer: MEDICARE

## 2018-07-04 LAB
ANION GAP SERPL CALCULATED.3IONS-SCNC: 5 MMOL/L (ref 3–14)
BASOPHILS # BLD AUTO: 0.1 10E9/L (ref 0–0.2)
BASOPHILS NFR BLD AUTO: 1.4 %
BUN SERPL-MCNC: 11 MG/DL (ref 7–30)
CALCIUM SERPL-MCNC: 7.5 MG/DL (ref 8.5–10.1)
CHLORIDE SERPL-SCNC: 108 MMOL/L (ref 94–109)
CO2 SERPL-SCNC: 30 MMOL/L (ref 20–32)
CREAT SERPL-MCNC: 0.88 MG/DL (ref 0.66–1.25)
DIFFERENTIAL METHOD BLD: ABNORMAL
EOSINOPHIL # BLD AUTO: 0 10E9/L (ref 0–0.7)
EOSINOPHIL NFR BLD AUTO: 1.1 %
ERYTHROCYTE [DISTWIDTH] IN BLOOD BY AUTOMATED COUNT: 16.5 % (ref 10–15)
GFR SERPL CREATININE-BSD FRML MDRD: 87 ML/MIN/1.7M2
GLUCOSE SERPL-MCNC: 104 MG/DL (ref 70–99)
HCT VFR BLD AUTO: 30.8 % (ref 40–53)
HGB BLD-MCNC: 10.2 G/DL (ref 13.3–17.7)
IMM GRANULOCYTES # BLD: 0 10E9/L (ref 0–0.4)
IMM GRANULOCYTES NFR BLD: 0.6 %
INR PPP: 1.07 (ref 0.86–1.14)
LYMPHOCYTES # BLD AUTO: 0.6 10E9/L (ref 0.8–5.3)
LYMPHOCYTES NFR BLD AUTO: 17.2 %
MAGNESIUM SERPL-MCNC: 1.8 MG/DL (ref 1.6–2.3)
MCH RBC QN AUTO: 29.9 PG (ref 26.5–33)
MCHC RBC AUTO-ENTMCNC: 33.1 G/DL (ref 31.5–36.5)
MCV RBC AUTO: 90 FL (ref 78–100)
MONOCYTES # BLD AUTO: 0.4 10E9/L (ref 0–1.3)
MONOCYTES NFR BLD AUTO: 10.1 %
NEUTROPHILS # BLD AUTO: 2.5 10E9/L (ref 1.6–8.3)
NEUTROPHILS NFR BLD AUTO: 69.6 %
NRBC # BLD AUTO: 0 10*3/UL
NRBC BLD AUTO-RTO: 0 /100
PLATELET # BLD AUTO: 86 10E9/L (ref 150–450)
POTASSIUM SERPL-SCNC: 3.3 MMOL/L (ref 3.4–5.3)
POTASSIUM SERPL-SCNC: 3.9 MMOL/L (ref 3.4–5.3)
RBC # BLD AUTO: 3.41 10E12/L (ref 4.4–5.9)
RETICS # AUTO: 21.6 10E9/L (ref 25–95)
RETICS/RBC NFR AUTO: 0.6 % (ref 0.5–2)
SODIUM SERPL-SCNC: 143 MMOL/L (ref 133–144)
WBC # BLD AUTO: 3.6 10E9/L (ref 4–11)

## 2018-07-04 PROCEDURE — 94640 AIRWAY INHALATION TREATMENT: CPT | Mod: 76

## 2018-07-04 PROCEDURE — 85610 PROTHROMBIN TIME: CPT | Performed by: PHYSICIAN ASSISTANT

## 2018-07-04 PROCEDURE — 83735 ASSAY OF MAGNESIUM: CPT | Performed by: PHYSICIAN ASSISTANT

## 2018-07-04 PROCEDURE — 84132 ASSAY OF SERUM POTASSIUM: CPT | Performed by: HOSPITALIST

## 2018-07-04 PROCEDURE — 99232 SBSQ HOSP IP/OBS MODERATE 35: CPT | Performed by: HOSPITALIST

## 2018-07-04 PROCEDURE — 85045 AUTOMATED RETICULOCYTE COUNT: CPT | Performed by: PHYSICIAN ASSISTANT

## 2018-07-04 PROCEDURE — 25000132 ZZH RX MED GY IP 250 OP 250 PS 637: Mod: GY | Performed by: INTERNAL MEDICINE

## 2018-07-04 PROCEDURE — 25000132 ZZH RX MED GY IP 250 OP 250 PS 637: Mod: GY | Performed by: HOSPITALIST

## 2018-07-04 PROCEDURE — 40000275 ZZH STATISTIC RCP TIME EA 10 MIN

## 2018-07-04 PROCEDURE — 25000128 H RX IP 250 OP 636: Performed by: INTERNAL MEDICINE

## 2018-07-04 PROCEDURE — 25000132 ZZH RX MED GY IP 250 OP 250 PS 637: Mod: GY | Performed by: PHYSICIAN ASSISTANT

## 2018-07-04 PROCEDURE — 85025 COMPLETE CBC W/AUTO DIFF WBC: CPT | Performed by: PHYSICIAN ASSISTANT

## 2018-07-04 PROCEDURE — 25000128 H RX IP 250 OP 636: Performed by: PHYSICIAN ASSISTANT

## 2018-07-04 PROCEDURE — 76700 US EXAM ABDOM COMPLETE: CPT

## 2018-07-04 PROCEDURE — 40000847 ZZHCL STATISTIC MORPHOLOGY W/INTERP HISTOLOGY TC 85060: Performed by: PHYSICIAN ASSISTANT

## 2018-07-04 PROCEDURE — 85060 BLOOD SMEAR INTERPRETATION: CPT | Performed by: PHYSICIAN ASSISTANT

## 2018-07-04 PROCEDURE — A9270 NON-COVERED ITEM OR SERVICE: HCPCS | Mod: GY | Performed by: HOSPITALIST

## 2018-07-04 PROCEDURE — 36415 COLL VENOUS BLD VENIPUNCTURE: CPT | Performed by: HOSPITALIST

## 2018-07-04 PROCEDURE — A9270 NON-COVERED ITEM OR SERVICE: HCPCS | Mod: GY | Performed by: PHYSICIAN ASSISTANT

## 2018-07-04 PROCEDURE — 80048 BASIC METABOLIC PNL TOTAL CA: CPT | Performed by: PHYSICIAN ASSISTANT

## 2018-07-04 PROCEDURE — 87633 RESP VIRUS 12-25 TARGETS: CPT | Performed by: HOSPITALIST

## 2018-07-04 PROCEDURE — 94640 AIRWAY INHALATION TREATMENT: CPT

## 2018-07-04 PROCEDURE — 36415 COLL VENOUS BLD VENIPUNCTURE: CPT | Performed by: PHYSICIAN ASSISTANT

## 2018-07-04 PROCEDURE — 87040 BLOOD CULTURE FOR BACTERIA: CPT | Performed by: HOSPITALIST

## 2018-07-04 PROCEDURE — 25000125 ZZHC RX 250: Performed by: INTERNAL MEDICINE

## 2018-07-04 PROCEDURE — 12000000 ZZH R&B MED SURG/OB

## 2018-07-04 PROCEDURE — A9270 NON-COVERED ITEM OR SERVICE: HCPCS | Mod: GY | Performed by: INTERNAL MEDICINE

## 2018-07-04 RX ORDER — POTASSIUM CL/LIDO/0.9 % NACL 10MEQ/0.1L
10 INTRAVENOUS SOLUTION, PIGGYBACK (ML) INTRAVENOUS
Status: DISCONTINUED | OUTPATIENT
Start: 2018-07-04 | End: 2018-07-05 | Stop reason: HOSPADM

## 2018-07-04 RX ORDER — HYDROCODONE BITARTRATE AND ACETAMINOPHEN 5; 325 MG/1; MG/1
1-2 TABLET ORAL EVERY 6 HOURS PRN
Status: DISCONTINUED | OUTPATIENT
Start: 2018-07-04 | End: 2018-07-05 | Stop reason: HOSPADM

## 2018-07-04 RX ORDER — POTASSIUM CHLORIDE 29.8 MG/ML
20 INJECTION INTRAVENOUS
Status: DISCONTINUED | OUTPATIENT
Start: 2018-07-04 | End: 2018-07-05 | Stop reason: HOSPADM

## 2018-07-04 RX ORDER — MAGNESIUM SULFATE HEPTAHYDRATE 40 MG/ML
4 INJECTION, SOLUTION INTRAVENOUS EVERY 4 HOURS PRN
Status: DISCONTINUED | OUTPATIENT
Start: 2018-07-04 | End: 2018-07-05 | Stop reason: HOSPADM

## 2018-07-04 RX ORDER — POTASSIUM CHLORIDE 7.45 MG/ML
10 INJECTION INTRAVENOUS
Status: DISCONTINUED | OUTPATIENT
Start: 2018-07-04 | End: 2018-07-05 | Stop reason: HOSPADM

## 2018-07-04 RX ORDER — POTASSIUM CHLORIDE 1.5 G/1.58G
20-40 POWDER, FOR SOLUTION ORAL
Status: DISCONTINUED | OUTPATIENT
Start: 2018-07-04 | End: 2018-07-05 | Stop reason: HOSPADM

## 2018-07-04 RX ORDER — POTASSIUM CHLORIDE 1500 MG/1
20-40 TABLET, EXTENDED RELEASE ORAL
Status: DISCONTINUED | OUTPATIENT
Start: 2018-07-04 | End: 2018-07-05 | Stop reason: HOSPADM

## 2018-07-04 RX ADMIN — ALBUTEROL SULFATE 2.5 MG: 2.5 SOLUTION RESPIRATORY (INHALATION) at 11:52

## 2018-07-04 RX ADMIN — ALBUTEROL SULFATE 2.5 MG: 2.5 SOLUTION RESPIRATORY (INHALATION) at 07:09

## 2018-07-04 RX ADMIN — CEFTRIAXONE SODIUM 2 G: 2 INJECTION, POWDER, FOR SOLUTION INTRAMUSCULAR; INTRAVENOUS at 14:41

## 2018-07-04 RX ADMIN — POTASSIUM CHLORIDE 20 MEQ: 1500 TABLET, EXTENDED RELEASE ORAL at 14:41

## 2018-07-04 RX ADMIN — Medication 1 MG: at 21:01

## 2018-07-04 RX ADMIN — HYDROCODONE BITARTRATE AND ACETAMINOPHEN 1 TABLET: 5; 325 TABLET ORAL at 21:01

## 2018-07-04 RX ADMIN — IBUPROFEN 600 MG: 600 TABLET ORAL at 00:07

## 2018-07-04 RX ADMIN — ALBUTEROL SULFATE 2.5 MG: 2.5 SOLUTION RESPIRATORY (INHALATION) at 19:27

## 2018-07-04 RX ADMIN — HYDROCODONE BITARTRATE AND ACETAMINOPHEN 1 TABLET: 5; 325 TABLET ORAL at 14:48

## 2018-07-04 RX ADMIN — POTASSIUM CHLORIDE 40 MEQ: 1500 TABLET, EXTENDED RELEASE ORAL at 12:39

## 2018-07-04 RX ADMIN — SODIUM CHLORIDE: 9 INJECTION, SOLUTION INTRAVENOUS at 01:41

## 2018-07-04 RX ADMIN — ALBUTEROL SULFATE 2.5 MG: 2.5 SOLUTION RESPIRATORY (INHALATION) at 15:05

## 2018-07-04 RX ADMIN — SODIUM CHLORIDE: 9 INJECTION, SOLUTION INTRAVENOUS at 09:38

## 2018-07-04 RX ADMIN — SENNOSIDES 8.6 MG: 8.6 TABLET, FILM COATED ORAL at 19:37

## 2018-07-04 RX ADMIN — ONDANSETRON 4 MG: 4 TABLET, ORALLY DISINTEGRATING ORAL at 14:48

## 2018-07-04 RX ADMIN — TAMSULOSIN HYDROCHLORIDE 0.4 MG: 0.4 CAPSULE ORAL at 19:37

## 2018-07-04 NOTE — PROVIDER NOTIFICATION
07/04/18 1521   Vital Signs   Temp 100.5  F (38.1  C)  (MD notified, BC ordered)   Dr. Hackett aware.

## 2018-07-04 NOTE — PROGRESS NOTES
RN 1500-19:30:  Patient transferred from OBS unit.  Lungs diminished, on RA.  Vss, complained of headache/ibuprofen given.  Rash noted on arms/back/chest.  Tolerating diet/.  Senna given for constipation.  Getting IVF.  Having US tomorrow.

## 2018-07-04 NOTE — PLAN OF CARE
Problem: Patient Care Overview  Goal: Plan of Care/Patient Progress Review  VSS. 1LNC for SpO2 94%. Was high 80's on RA. LS diminished. A&O x4. C/O HA, ibuprofen w effect. Up independently in room. On zithromax and rocephin for pneumonia. Red rash on back, arms and chest - MD aware. Left sticky note as patient mentioned he had joint pain the last time he took Zithromax. Plan for abdominal US today for elevated LFTs. NPO since MN. Slept well

## 2018-07-04 NOTE — PLAN OF CARE
Problem: Pneumonia (Adult)  Goal: Signs and Symptoms of Listed Potential Problems Will be Absent, Minimized or Managed (Pneumonia)  Signs and symptoms of listed potential problems will be absent, minimized or managed by discharge/transition of care (reference Pneumonia (Adult) CPG).   Outcome: Improving  A&O. Lung sounds diminished. Pt on RA, removed from O2 during this shift, VSS, O2 sats adequate. IV SL. CMS intact. Bowel sounds active and present. Up independently. Denies pain.  Possible d/c today or tomorrow.

## 2018-07-04 NOTE — DISCHARGE INSTRUCTIONS
Pt has clothes/shoes in bedside dresser.    *No school or work until you have been without a fever for 24 hours with tylenol or motrin.  *Take medications as prescribed.  Ibuprofen for pain and fever.  Zofran for nausea. Augmentin and azithromycin.  *Follow-up with your doctor for a recheck in 2-3 days.    *Return if you develop difficulty breathing or swallowing, are unable to keep fluids down, faint or feel like you will faint or become worse in any way.    Discharge Instructions  Bronchitis, Pneumonia, Bronchospasm    You were seen today for a chest infection or inflammation. If your doctor decided this was due to a bacterial infection, you may need an antibiotic. Sometimes these are caused by a virus, and then an antibiotic will not help.     Return to the Emergency Department if:    Your breathing gets much worse.    You are very weak, or feel much more ill.    You develop new symptoms, such as chest pain.    You cough up blood.    You are vomiting enough that you can t keep fluids or your medicine down.    What can I do to help myself?    Fill any prescriptions the doctor gave you and take them right away--especially antibiotics. Be sure to finish the whole antibiotic prescription.    You may be given a prescription for an inhaler, which can help loosen tight air passages.  Use this as needed, but not more often than directed. Inhalers work much better when used with a spacer.     You may be given a prescription for a steroid to reduce inflammation. Used long-term, these can have many serious side effects, but for short courses these do not happen. You may notice restlessness or increased appetite.        You may use non-prescription cough or cold medicines. Cough medicines may help, but don t make the cough go away completely.     Avoid smoke, because this can make your symptoms worse. If you smoke, this may be a good time to quit! Consider using nicotine lozenges, gum, or patches to reduce cravings.     If  "you have a fever, Tylenol  (acetaminophen), Motrin  (ibuprofen), or Advil  (ibuprofen) may help bring fever down and may help you feel more comfortable. Be sure to read and follow the package directions, and ask your doctor if you have questions.    Be sure to get your flu shot each year.  The pneumonia shot can help prevent pneumonia.  Probiotics: If you have been given an antibiotic, you may want to also take a probiotic pill or eat yogurt with live cultures. Probiotics have \"good bacteria\" to help your intestines stay healthy. Studies have shown that probiotics help prevent diarrhea and other intestine problems (including C. diff infection) when you take antibiotics. You can buy these without a prescription in the pharmacy section of the store.     If your doctor has told you to follow-up at your clinic, be sure to call right away and go to your appointment.  If there is any problem with keeping your appointment, call your doctor or return to the Emergency Department.    If you were given a prescription for medicine here today, be sure to read all of the information (including the package insert) that comes with your prescription.  This will include important information about the medicine, its side effects, and any warnings that you need to know about.  The pharmacist who fills the prescription can provide more information and answer questions you may have about the medicine.  If you have questions or concerns that the pharmacist cannot address, please call or return to the Emergency Department.     Opioid Medication Information    Pain medications are among the most commonly prescribed medicines, so we are including this information for all our patients. If you did not receive pain medication or get a prescription for pain medicine, you can ignore it.     You may have been given a prescription for an opioid (narcotic) pain medicine and/or have received a pain medicine while here in the Emergency Department. " These medicines can make you drowsy or impaired. You must not drive, operate dangerous equipment, or engage in any other dangerous activities while taking these medications. If you drive while taking these medications, you could be arrested for DUI, or driving under the influence. Do not drink any alcohol while you are taking these medications.     Opioid pain medications can cause addiction. If you have a history of chemical dependency of any type, you are at a higher risk of becoming addicted to pain medications.  Only take these prescribed medications to treat your pain when all other options have been tried. Take it for as short a time and as few doses as possible. Store your pain pills in a secure place, as they are frequently stolen and provide a dangerous opportunity for children or visitors in your house to start abusing these powerful medications. We will not replace any lost or stolen medicine.  As soon as your pain is better, you should flush all your remaining medication.     Many prescription pain medications contain Tylenol  (acetaminophen), including Vicodin , Tylenol #3 , Norco , Lortab , and Percocet .  You should not take any extra pills of Tylenol  if you are using these prescription medications or you can get very sick.  Do not ever take more than 3000 mg of acetaminophen in any 24 hour period.    All opioids tend to cause constipation. Drink plenty of water and eat foods that have a lot of fiber, such as fruits, vegetables, prune juice, apple juice and high fiber cereal.  Take a laxative if you don t move your bowels at least every other day. Miralax , Milk of Magnesia, Colace , or Senna  can be used to keep you regular.      Remember that you can always come back to the Emergency Department if you are not able to see your regular doctor in the amount of time listed above, if you get any new symptoms, or if there is anything that worries you.

## 2018-07-04 NOTE — PROGRESS NOTES
Patient currently on room air. Lung sounds clear. SpO2 in the mid 90's.  Nebulizer tx given as ordered. Will continue to follow.    Mayra KIM

## 2018-07-04 NOTE — PROGRESS NOTES
[facility]  Hospitalist Progress Note        Ramon Eryn Hackett, DO  07/04/2018        Interval History:      Patient states that he is feeling better.          Assessment and Plan:        Elmer Carter is a 63 year old male visiting from [place] with a past medical history significant for BPH, remote PE (provoked after surgery), and hypothyroidism who was admitted on 7/2/2018 with pneumonia after presenting with fevers, chills, and shortness of breath.      Community acquired pneumonia. CXR and chest CR show LLL infiltrate. Patient presented with fevers and chills with tmax of 103. He has remained afebrile today. O2 sat stable on room air at rest. He continues to feel short of breath today. Also noted occasional cough, headache, and poor appetite. Was able to eat a little today. Procalcitonin is elevated at 1.29 suggesting moderate risk of systemic infection. Some viral features as well including rash and headache. WBC low at 2.9. BP soft but stable. BC remain negative.   - Ceftriaxone  - Nebs prn     Mild pancytopenia. Stable from admission. Likely reactive in setting of infection. neutrophils WNL.   - Monitor.   - Peripheral smear  - Reticulocyte index     Elevated LFTs. Trending slightly downward today though t. Bili remains elevated at 2.3 with direct bili of 1.6. Etiology is unclear. Abdominal exam is benign. CT abdomen shows trace fluid in pelvis and paracolic gutter. Tolerating regular diet. No jaundice. Denies significant PTA tylenol use. Denies alcohol use.   - US abdomen completed.   - LFTs  - hepatitis screen pending.   - INR normal.      Diffuse macular rash. Does not look like drug rash or urticaria. No itching, pain. No associated throat tightness or increased SOB. Started this afternoon without clear precipitating factor.   - Discontinued Azithromycin.      Hypothyroidism. Resume levothyroxine     Remote hx of DVT. Occurred after knee surgery in 1987. No recurrence.      DVT  "Prophylaxis: Pneumatic Compression Devices  Code Status: Full Code     Disposition: Expected discharge in 1-2 days pending clinical improvement     Pain: # Pain Assessment:  Current Pain Score 2018   Patient currently in pain? sleeping: patient not able to self report   Pain score (0-10) -   Pain location -   Pain descriptors -   Elmer s pain level was assessed and he currently denies pain.                     Physical Exam:      Heart Rate: 95    Blood pressure 112/66, pulse 89, temperature 98.1  F (36.7  C), temperature source Oral, resp. rate 16, height 1.918 m (6' 3.5\"), weight 82.1 kg (181 lb), SpO2 94 %.    Vitals:    18 1132   Weight: 82.1 kg (181 lb)       Vital Sign Ranges  Temperature Temp  Av.6  F (37  C)  Min: 98.1  F (36.7  C)  Max: 99.1  F (37.3  C)   Blood pressure Systolic (24hrs), Av , Min:99 , Max:112        Diastolic (24hrs), Av, Min:54, Max:66      Pulse No Data Recorded   Respirations Resp  Av.4  Min: 16  Max: 18   Pulse oximetry SpO2  Av.4 %  Min: 89 %  Max: 96 %     Vital Signs with Ranges  Temp:  [98.1  F (36.7  C)-99.1  F (37.3  C)] 98.1  F (36.7  C)  Heart Rate:  [77-95] 95  Resp:  [16-18] 16  BP: ()/(54-66) 112/66  SpO2:  [89 %-96 %] 94 %    I/O Last 3 Shifts:   I/O last 3 completed shifts:  In: 4745.83 [P.O.:820; I.V.:3925.83]  Out: -     I/O past 24 hours:     Intake/Output Summary (Last 24 hours) at 18 0911  Last data filed at 18 0600   Gross per 24 hour   Intake          4745.83 ml   Output                0 ml   Net          4745.83 ml     GENERAL: Alert and oriented. NAD. Conversational, appropriate.   HEENT: Normocephalic. EOMI. No icterus or injection. Nares normal.   LUNGS: Clear to auscultation. No dyspnea at rest.   HEART: Regular rate. Extremities perfused. Macular rash present on chest improving.   ABDOMEN: Soft, nontender, and nondistended. Positive bowel sounds.   EXTREMITIES: No LE edema noted.   NEUROLOGIC: Moves " extremities x4 on command. No acute focal neurologic abnormalities noted. No meningeal signs.          Prior to Admission Medications:        Prescriptions Prior to Admission   Medication Sig Dispense Refill Last Dose     Celecoxib (CELEBREX PO) Take 100 mg by mouth 2 times daily as needed for moderate pain   Past Month at Unknown time     IBUPROFEN PO Take 400 mg by mouth every 6 hours as needed for moderate pain   Past Week at Unknown time     Levothyroxine Sodium (SYNTHROID PO) Take 75 mcg by mouth daily    7/2/2018 at am     OMEPRAZOLE PO Take 20 mg by mouth daily as needed (Takes when taking ibuprofen)   Past Week at Unknown time     tamsulosin (FLOMAX) 0.4 MG capsule Take 0.4 mg by mouth At Bedtime    7/1/2018 at hs     TURMERIC CURCUMIN PO Take by mouth daily   7/1/2018 at Unknown time     VITAMIN D, CHOLECALCIFEROL, PO Take 1,000 Units by mouth daily   7/1/2018 at Unknown time            Medications:        Current Facility-Administered Medications   Medication Last Rate     sodium chloride 125 mL/hr at 07/04/18 0141     Current Facility-Administered Medications   Medication Dose Route Frequency     albuterol  2.5 mg Nebulization 4x Daily     azithromycin  250 mg Oral QPM     cefTRIAXone  2 g Intravenous Q24H     tamsulosin  0.4 mg Oral At Bedtime     Current Facility-Administered Medications   Medication Dose Route Frequency     albuterol  2.5 mg Nebulization Q2H PRN     ibuprofen (ADVIL/MOTRIN) tablet 600 mg  600 mg Oral Q6H PRN     melatonin  1 mg Oral At Bedtime PRN     naloxone  0.1-0.4 mg Intravenous Q2 Min PRN     ondansetron  4 mg Oral Q6H PRN    Or     ondansetron  4 mg Intravenous Q6H PRN     sennosides  8.6 mg Oral BID PRN            Data:      Lab data reviewed.     Recent Labs  Lab 07/03/18  0625 07/02/18  1200   HGB 11.4* 11.7*   MCV 91 90   PLT 87* 104*    133   POTASSIUM 3.6 4.2   CHLORIDE 105 98   CO2 27 25   BUN 13 15   CR 0.97 0.91   ANIONGAP 9 10   NOELLE 8.0* 8.2*   * 100*            Imaging:      Imaging data reviewed.     Dr. Ramon Hackett D.O.  [facility]  Pager [phone]

## 2018-07-05 ENCOUNTER — APPOINTMENT (OUTPATIENT)
Dept: CARDIOLOGY | Facility: CLINIC | Age: 63
DRG: 871 | End: 2018-07-05
Attending: HOSPITALIST
Payer: MEDICARE

## 2018-07-05 VITALS
TEMPERATURE: 98.8 F | OXYGEN SATURATION: 90 % | HEIGHT: 76 IN | BODY MASS INDEX: 22.04 KG/M2 | HEART RATE: 82 BPM | WEIGHT: 181 LBS | DIASTOLIC BLOOD PRESSURE: 67 MMHG | SYSTOLIC BLOOD PRESSURE: 116 MMHG | RESPIRATION RATE: 16 BRPM

## 2018-07-05 LAB
ALBUMIN SERPL-MCNC: 2 G/DL (ref 3.4–5)
ALP SERPL-CCNC: 221 U/L (ref 40–150)
ALT SERPL W P-5'-P-CCNC: 92 U/L (ref 0–70)
ANION GAP SERPL CALCULATED.3IONS-SCNC: 5 MMOL/L (ref 3–14)
AST SERPL W P-5'-P-CCNC: 39 U/L (ref 0–45)
BILIRUB SERPL-MCNC: 1.9 MG/DL (ref 0.2–1.3)
BUN SERPL-MCNC: 8 MG/DL (ref 7–30)
CALCIUM SERPL-MCNC: 7.8 MG/DL (ref 8.5–10.1)
CHLORIDE SERPL-SCNC: 103 MMOL/L (ref 94–109)
CO2 SERPL-SCNC: 29 MMOL/L (ref 20–32)
COPATH REPORT: NORMAL
CREAT SERPL-MCNC: 0.95 MG/DL (ref 0.66–1.25)
ERYTHROCYTE [DISTWIDTH] IN BLOOD BY AUTOMATED COUNT: 16.6 % (ref 10–15)
FLUAV H1 2009 PAND RNA SPEC QL NAA+PROBE: NEGATIVE
FLUAV H1 RNA SPEC QL NAA+PROBE: NEGATIVE
FLUAV H3 RNA SPEC QL NAA+PROBE: NEGATIVE
FLUAV RNA SPEC QL NAA+PROBE: NEGATIVE
FLUBV RNA SPEC QL NAA+PROBE: NEGATIVE
GFR SERPL CREATININE-BSD FRML MDRD: 80 ML/MIN/1.7M2
GLUCOSE SERPL-MCNC: 96 MG/DL (ref 70–99)
HADV DNA SPEC QL NAA+PROBE: NEGATIVE
HADV DNA SPEC QL NAA+PROBE: NEGATIVE
HBV SURFACE AB SERPL IA-ACNC: 23.35 M[IU]/ML
HBV SURFACE AG SERPL QL IA: NONREACTIVE
HCT VFR BLD AUTO: 31.9 % (ref 40–53)
HCV AB SERPL QL IA: NONREACTIVE
HGB BLD-MCNC: 10.4 G/DL (ref 13.3–17.7)
HMPV RNA SPEC QL NAA+PROBE: NEGATIVE
HPIV1 RNA SPEC QL NAA+PROBE: NEGATIVE
HPIV2 RNA SPEC QL NAA+PROBE: NEGATIVE
HPIV3 RNA SPEC QL NAA+PROBE: NEGATIVE
MAGNESIUM SERPL-MCNC: 1.7 MG/DL (ref 1.6–2.3)
MCH RBC QN AUTO: 29.5 PG (ref 26.5–33)
MCHC RBC AUTO-ENTMCNC: 32.6 G/DL (ref 31.5–36.5)
MCV RBC AUTO: 90 FL (ref 78–100)
MICROBIOLOGIST REVIEW: NORMAL
NT-PROBNP SERPL-MCNC: 1925 PG/ML (ref 0–900)
PLATELET # BLD AUTO: 127 10E9/L (ref 150–450)
POTASSIUM SERPL-SCNC: 4.1 MMOL/L (ref 3.4–5.3)
PROCALCITONIN SERPL-MCNC: 0.69 NG/ML
PROT SERPL-MCNC: 5.8 G/DL (ref 6.8–8.8)
RBC # BLD AUTO: 3.53 10E12/L (ref 4.4–5.9)
RHINOVIRUS RNA SPEC QL NAA+PROBE: NEGATIVE
RSV RNA SPEC QL NAA+PROBE: NEGATIVE
RSV RNA SPEC QL NAA+PROBE: NEGATIVE
SODIUM SERPL-SCNC: 137 MMOL/L (ref 133–144)
SPECIMEN SOURCE: NORMAL
WBC # BLD AUTO: 5 10E9/L (ref 4–11)

## 2018-07-05 PROCEDURE — 36415 COLL VENOUS BLD VENIPUNCTURE: CPT | Performed by: HOSPITALIST

## 2018-07-05 PROCEDURE — 93306 TTE W/DOPPLER COMPLETE: CPT | Mod: 26 | Performed by: INTERNAL MEDICINE

## 2018-07-05 PROCEDURE — 25000132 ZZH RX MED GY IP 250 OP 250 PS 637: Mod: GY | Performed by: HOSPITALIST

## 2018-07-05 PROCEDURE — 83880 ASSAY OF NATRIURETIC PEPTIDE: CPT | Performed by: HOSPITALIST

## 2018-07-05 PROCEDURE — 93306 TTE W/DOPPLER COMPLETE: CPT

## 2018-07-05 PROCEDURE — 99239 HOSP IP/OBS DSCHRG MGMT >30: CPT | Performed by: HOSPITALIST

## 2018-07-05 PROCEDURE — 85027 COMPLETE CBC AUTOMATED: CPT | Performed by: HOSPITALIST

## 2018-07-05 PROCEDURE — 25000128 H RX IP 250 OP 636: Performed by: HOSPITALIST

## 2018-07-05 PROCEDURE — 83735 ASSAY OF MAGNESIUM: CPT | Performed by: HOSPITALIST

## 2018-07-05 PROCEDURE — 84145 PROCALCITONIN (PCT): CPT | Performed by: HOSPITALIST

## 2018-07-05 PROCEDURE — 80053 COMPREHEN METABOLIC PANEL: CPT | Performed by: HOSPITALIST

## 2018-07-05 PROCEDURE — 25000125 ZZHC RX 250: Performed by: INTERNAL MEDICINE

## 2018-07-05 PROCEDURE — A9270 NON-COVERED ITEM OR SERVICE: HCPCS | Mod: GY | Performed by: HOSPITALIST

## 2018-07-05 RX ORDER — LEVOTHYROXINE SODIUM 75 UG/1
75 TABLET ORAL DAILY
Status: DISCONTINUED | OUTPATIENT
Start: 2018-07-05 | End: 2018-07-05 | Stop reason: HOSPADM

## 2018-07-05 RX ORDER — BISACODYL 10 MG
10 SUPPOSITORY, RECTAL RECTAL DAILY PRN
Status: DISCONTINUED | OUTPATIENT
Start: 2018-07-05 | End: 2018-07-05 | Stop reason: HOSPADM

## 2018-07-05 RX ORDER — AMOXICILLIN 250 MG
1 CAPSULE ORAL 2 TIMES DAILY PRN
Status: DISCONTINUED | OUTPATIENT
Start: 2018-07-05 | End: 2018-07-05 | Stop reason: HOSPADM

## 2018-07-05 RX ORDER — AMOXICILLIN 250 MG
2 CAPSULE ORAL 2 TIMES DAILY PRN
Status: DISCONTINUED | OUTPATIENT
Start: 2018-07-05 | End: 2018-07-05 | Stop reason: HOSPADM

## 2018-07-05 RX ORDER — POLYETHYLENE GLYCOL 3350 17 G/17G
17 POWDER, FOR SOLUTION ORAL DAILY PRN
Status: DISCONTINUED | OUTPATIENT
Start: 2018-07-05 | End: 2018-07-05 | Stop reason: HOSPADM

## 2018-07-05 RX ORDER — FUROSEMIDE 10 MG/ML
40 INJECTION INTRAMUSCULAR; INTRAVENOUS ONCE
Status: COMPLETED | OUTPATIENT
Start: 2018-07-05 | End: 2018-07-05

## 2018-07-05 RX ORDER — POLYETHYLENE GLYCOL 3350 17 G/17G
17 POWDER, FOR SOLUTION ORAL DAILY
Status: DISCONTINUED | OUTPATIENT
Start: 2018-07-05 | End: 2018-07-05 | Stop reason: HOSPADM

## 2018-07-05 RX ADMIN — POLYETHYLENE GLYCOL 3350 17 G: 17 POWDER, FOR SOLUTION ORAL at 07:50

## 2018-07-05 RX ADMIN — LEVOTHYROXINE SODIUM 75 MCG: 75 TABLET ORAL at 07:50

## 2018-07-05 RX ADMIN — ALBUTEROL SULFATE 2.5 MG: 2.5 SOLUTION RESPIRATORY (INHALATION) at 08:02

## 2018-07-05 RX ADMIN — FUROSEMIDE 40 MG: 10 INJECTION, SOLUTION INTRAVENOUS at 09:18

## 2018-07-05 NOTE — PLAN OF CARE
Problem: Patient Care Overview  Goal: Plan of Care/Patient Progress Review  Outcome: No Change  Independent, VSS except low grade temp, denies headache and shortness of breath,alert & oriented, lung sound diminished, red generalized rash on back, arm, leg and chest, droplet precaution maintained, respiratory viral panel pending, on 2 liters oxygen sats 94%, RN will continue to monitor.

## 2018-07-05 NOTE — PROGRESS NOTES
Discharge    Patient discharged to home via own transportation with wife, son, daughter in law, and grandchildren   Care plan note: low grade fever at beginning of shift, rechecked, 98.8. Able to wean off O2, ambulated in hallway, O2 sat maintained >90%. +BS, lungs diminished on bases. Denied pain. IND in room. PIV patent and SL. Release of information formed filled by pt and sent to HIM. Pt and family members were given d/c info, verbalized understanding. Meds filled here and sent with pt. Xray result also sent with pt via a CD. Wanda d/c around 1230.     Listed belongings gathered and returned to patient. Yes  Care Plan and Patient education resolved: Yes  Prescriptions if needed, hard copies sent with patient  Yes  Home and hospital acquired medications returned to patient: NA  Medication Bin checked and emptied on discharge Yes  Follow up appointment made for patient: No, pt lives in [place], will make his own appointment.

## 2018-07-05 NOTE — PLAN OF CARE
Problem: Patient Care Overview  Goal: Plan of Care/Patient Progress Review  Outcome: No Change  VSS. 2LNC for SpO2 94%. LS diminished. Up independently in room. A&O x4. Febrile, max 100.5, BC repeated and pending. C/O HA, norco per MD given. Ice helps. Red rash on back, arms and chest - MD aware. Viral panel pending, in droplet iso. Continue to monitor.

## 2018-07-05 NOTE — DISCHARGE SUMMARY
[facility]    Discharge Summary  Hospitalist    Date of Admission:  7/2/2018  Date of Discharge:  7/5/2018  Discharging Provider: Ramon Hackett  Date of Service (when I saw the patient): 07/05/18    History of Present Illness   Elmer Carter is a 63 year old male visiting from [place] with his family who presented to the [facility]  with fevers and chills.  He was diagnosed in the ER with a pneumonia.  He flew in with his family Friday and started having chills.  These worsened into sweats and rigors over the weekend.  He also had a worsening non-productive cough.  No chest pain, emesis.  He has had a decreased appetite though.  No other acute complaints.  No other known sick contacts.  Denies asthma/COPD, other recent infections or major medical problems.  He is planning to go back to [place] in a few days.        Hospital Course   Elmer Carter was admitted on 7/2/2018.  The following problems were addressed during his hospitalization:    Elmer Carter is a 63 year old male visiting from [place] with a past medical history significant for BPH, remote PE (provoked after surgery), and hypothyroidism who was admitted on 7/2/2018 with pneumonia after presenting with fevers, chills, and shortness of breath.       Community acquired pneumonia, Bacterial Sepsis due to community-acquired pneumonia. CXR and chest CR show LLL infiltrate. Patient presented with fevers and chills with tmax of 103. He has remained afebrile today. O2 sat stable on room air at rest. He continues to feel short of breath today. Also noted occasional cough, headache, and poor appetite. Was able to eat a little today. Procalcitonin is elevated at 1.29 suggesting moderate risk of systemic infection. Some viral features as well including rash and headache. WBC low at 2.9. BP soft but stable. BC remain negative.   - Transitioned to oral antibiotic to complete course, Augmentin.   - Close outpatient follow up.   - Patient eager  to discharge, discussed results of tests and pending tests, verbalized understanding of importance to follow up with PCP      Mild pancytopenia. Stable from admission. Likely reactive in setting of infection. neutrophils WNL.   - Improving.       Elevated LFTs. Trending slightly downward today though t. Bili remains elevated at 2.3 with direct bili of 1.6. Etiology is unclear. Abdominal exam is benign. CT abdomen shows trace fluid in pelvis and paracolic gutter. Tolerating regular diet. No jaundice. Denies significant PTA tylenol use. Denies alcohol use.   - US abdomen completed.   - LFTs improving.   - hepatitis screen pending.       Diffuse macular rash. Does not look like drug rash or urticaria. No itching, pain. No associated throat tightness or increased SOB. Started this afternoon without clear precipitating factor.   - Discontinued Azithromycin.       Hypothyroidism. Resume levothyroxine      Remote hx of DVT. Occurred after knee surgery in 1987. No recurrence.       Pending Results   These results will be followed up by PCP  Unresulted Labs Ordered in the Past 30 Days of this Admission     Date and Time Order Name Status Description    7/4/2018 1943 Blood culture Preliminary     7/4/2018 1943 Blood culture Preliminary     7/4/2018 1218 Respiratory Virus Panel by PCR In process     7/4/2018 1152 Blood Morphology Pathologist Review In process     7/3/2018 1256 Hepatitis C Screen Reflex to HCV RNA Quant and Genotype In process     7/3/2018 1256 Hepatitis B surface antigen In process     7/3/2018 1256 Hepatitis B Surface Antibody In process     7/2/2018 1215 Blood culture Preliminary     7/2/2018 1215 Blood culture Preliminary           Code Status   Full Code       Primary Care Physician   Physician No Ref-Primary    Physical Exam   Temp: 98.8  F (37.1  C) Temp src: Oral BP: 116/67 Pulse: 82 Heart Rate: 82 Resp: 16 SpO2: 90 % O2 Device: None (Room air) Oxygen Delivery: 2 LPM  Vitals:    07/02/18 1132   Weight:  82.1 kg (181 lb)     Vital Signs with Ranges  Temp:  [98.8  F (37.1  C)-100.5  F (38.1  C)] 98.8  F (37.1  C)  Pulse:  [82-97] 82  Heart Rate:  [82-98] 82  Resp:  [16-18] 16  BP: (107-117)/(66-71) 116/67  SpO2:  [87 %-97 %] 90 %       GENERAL: Alert and oriented. NAD. Conversational, appropriate.   HEENT: Normocephalic. EOMI. No icterus or injection. Nares normal.   LUNGS: Clear to auscultation, breathing comfortably. No dyspnea at rest.   HEART: Regular rate. Extremities perfused. Macular rash resolved.   ABDOMEN: Soft, nontender, and nondistended. Positive bowel sounds.   EXTREMITIES: No LE edema noted.   NEUROLOGIC: Moves extremities x4 on command. No acute focal neurologic abnormalities noted. No meningeal signs.     Discharge Disposition   Discharged to home  Condition at discharge: Stable    Consultations This Hospital Stay   None    Time Spent on this Encounter   I, Ramon Hackett, personally saw the patient today and spent greater than 30 minutes discharging this patient.    Discharge Orders     Reason for your hospital stay   Pneumonia     Follow-up and recommended labs and tests    Follow up with primary care provider, Physician No Ref-Primary, within 7 days for hospital follow- up.  The following labs/tests are recommended: cbc/bmp.     Activity   Your activity upon discharge: activity as tolerated     Full Code     Diet   Follow this diet upon discharge: Orders Placed This Encounter     Regular Diet Adult       Discharge Medications   Current Discharge Medication List      START taking these medications    Details   amoxicillin-clavulanate (AUGMENTIN) 875-125 MG per tablet Take 1 tablet by mouth 2 times daily  Qty: 14 tablet, Refills: 0    Associated Diagnoses: Pneumonia of left lower lobe due to infectious organism (H)      ondansetron (ZOFRAN ODT) 4 MG ODT tab Take 1-2 tablets (4-8 mg) by mouth every 8 hours as needed for nausea  Qty: 30 tablet, Refills: 0         CONTINUE these medications which  have NOT CHANGED    Details   Celecoxib (CELEBREX PO) Take 100 mg by mouth 2 times daily as needed for moderate pain      Levothyroxine Sodium (SYNTHROID PO) Take 75 mcg by mouth daily       OMEPRAZOLE PO Take 20 mg by mouth daily as needed (Takes when taking ibuprofen)      tamsulosin (FLOMAX) 0.4 MG capsule Take 0.4 mg by mouth At Bedtime       VITAMIN D, CHOLECALCIFEROL, PO Take 1,000 Units by mouth daily         STOP taking these medications       IBUPROFEN PO Comments:   Reason for Stopping:         TURMERIC CURCUMIN PO Comments:   Reason for Stopping:             Allergies   No Known Allergies  Data   Most Recent 3 CBC's:  Recent Labs   Lab Test  07/05/18   0750 07/04/18   0852  07/03/18   0625   WBC  5.0  3.6*  2.9*   HGB  10.4*  10.2*  11.4*   MCV  90  90  91   PLT  127*  86*  87*      Most Recent 3 BMP's:  Recent Labs   Lab Test  07/05/18   0750 07/04/18   1900  07/04/18   0852  07/03/18   0625   NA  137   --   143  141   POTASSIUM  4.1  3.9  3.3*  3.6   CHLORIDE  103   --   108  105   CO2  29   --   30  27   BUN  8   --   11  13   CR  0.95   --   0.88  0.97   ANIONGAP  5   --   5  9   NOELLE  7.8*   --   7.5*  8.0*   GLC  96   --   104*  100*     Most Recent 2 LFT's:  Recent Labs   Lab Test  07/05/18 0750 07/03/18   0625   AST  39  99*   ALT  92*  181*   ALKPHOS  221*  188*   BILITOTAL  1.9*  2.3*     Most Recent INR's and Anticoagulation Dosing History:  Anticoagulation Dose History     Recent Dosing and Labs Latest Ref Rng & Units 7/4/2018    INR 0.86 - 1.14 1.07        Most Recent 3 Troponin's:No lab results found.  Most Recent Cholesterol Panel:No lab results found.  Most Recent 6 Bacteria Isolates From Any Culture (See EPIC Reports for Culture Details):  Recent Labs   Lab Test  07/04/18 2017 07/04/18 2010 07/02/18   1251  07/02/18   1205   CULT  No growth after 8 hours  No growth after 8 hours  No growth after 3 days  No growth after 3 days     Most Recent TSH, T4 and A1c Labs:No lab results  found.  Results for orders placed or performed during the hospital encounter of 07/02/18   XR Chest 2 Views    Narrative    XR CHEST 2 VW 7/2/2018 1:47 PM    HISTORY: Cough and fever.    COMPARISON: None.    FINDINGS: Left basilar opacity. Right lung is clear. No pleural  effusion or pneumothorax. Normal heart size.      Impression    IMPRESSION: Left basilar pneumonia.    ANA SILVER MD   CT Chest/Abdomen/Pelvis w Contrast    Narrative    CT CHEST/ABDOMEN/PELVIS WITH CONTRAST 7/2/2018 1:50 PM     HISTORY:  Fever, cough, nausea and vomiting, abdominal tenderness;  oral water prep.      TECHNIQUE: Axial images from the thoracic inlet to the symphysis are  performed with additional coronal reformatted images. 91 mL of Isovue  370 are given intravenously.  Radiation dose for this scan was reduced  using automated exposure control, adjustment of the mA and/or kV  according to patient size, or iterative reconstruction technique.    FINDINGS:     Chest: Subtle infiltrate is noted at the lateral left lung base  concerning for pneumonia. Atelectasis is also noted at the posterior  costophrenic angles bilaterally. No pleural or pericardial fluid. The  heart is normal in size. The esophagus is unremarkable. Thyroid gland  is prominent in size with a few tiny subcentimeter cysts of doubtful  clinical significance. No enlarged lymph nodes in the chest or  axillas. Thoracic and abdominal aorta are unremarkable. No evidence of  aneurysm or dissection. Central pulmonary arteries are patent.    Abdomen: Prior cholecystectomy changes. The liver, spleen, pancreas,  adrenal glands and kidneys are unremarkable. No hydronephrosis. Trace  amount of ascites is noted in the region of the right paracolic  gutter. The bowel is normal in caliber without obstruction,  diverticulitis or appendicitis. No enlarged abdominal lymph nodes.    Pelvis: Prostate gland is mildly enlarged. The bladder and rectum are  unremarkable. No enlarged pelvic or  inguinal lymph nodes. Trace amount  of free pelvic fluid is noted.      Impression    IMPRESSION:  1. Bibasilar atelectasis with probable additional infiltrate left lung  base concerning for pneumonia. Lungs are otherwise clear.  2. Trace amount of fluid right paracolic gutter and pelvis without  obvious etiology. Abdominal and pelvic organs are within normal  limits. No bowel obstruction, diverticulitis or appendicitis.  3. Prior cholecystectomy changes.    CASSIA SU MD   US Abdomen Complete    Narrative    ULTRASOUND ABDOMEN COMPLETE 7/4/2018 8:14 AM     HISTORY: Elevated liver function tests.      COMPARISON: None.    FINDINGS:  Liver is normal in echogenicity without focal lesions.  Gallbladder is surgically absent. Extrahepatic bile duct normal in  diameter measuring up to 0.5 cm. Pancreas is normal where visualized.  Spleen is normal. Kidneys are normal in size. There is no  hydronephrosis. Proximal abdominal aorta is unremarkable. Trace  bilateral pleural effusions.      Impression    IMPRESSION: Trace bilateral pleural effusions, otherwise unremarkable.  No evidence of biliary dilatation.    ANA FALL MD

## 2018-07-08 LAB
BACTERIA SPEC CULT: NO GROWTH
BACTERIA SPEC CULT: NO GROWTH
Lab: NORMAL
Lab: NORMAL
SPECIMEN SOURCE: NORMAL
SPECIMEN SOURCE: NORMAL

## 2020-03-11 ENCOUNTER — HEALTH MAINTENANCE LETTER (OUTPATIENT)
Age: 65
End: 2020-03-11

## 2021-01-03 ENCOUNTER — HEALTH MAINTENANCE LETTER (OUTPATIENT)
Age: 66
End: 2021-01-03

## 2021-04-25 ENCOUNTER — HEALTH MAINTENANCE LETTER (OUTPATIENT)
Age: 66
End: 2021-04-25

## 2021-10-10 ENCOUNTER — HEALTH MAINTENANCE LETTER (OUTPATIENT)
Age: 66
End: 2021-10-10

## 2022-05-21 ENCOUNTER — HEALTH MAINTENANCE LETTER (OUTPATIENT)
Age: 67
End: 2022-05-21

## 2022-09-18 ENCOUNTER — HEALTH MAINTENANCE LETTER (OUTPATIENT)
Age: 67
End: 2022-09-18

## 2023-06-04 ENCOUNTER — HEALTH MAINTENANCE LETTER (OUTPATIENT)
Age: 68
End: 2023-06-04